# Patient Record
Sex: MALE | Race: WHITE
[De-identification: names, ages, dates, MRNs, and addresses within clinical notes are randomized per-mention and may not be internally consistent; named-entity substitution may affect disease eponyms.]

---

## 2018-12-10 ENCOUNTER — HOSPITAL ENCOUNTER (OUTPATIENT)
Dept: HOSPITAL 74 - JASU-SURG | Age: 83
Discharge: HOME | End: 2018-12-10
Attending: SURGERY
Payer: COMMERCIAL

## 2018-12-10 VITALS — TEMPERATURE: 98 F | DIASTOLIC BLOOD PRESSURE: 77 MMHG | HEART RATE: 66 BPM | SYSTOLIC BLOOD PRESSURE: 138 MMHG

## 2018-12-10 VITALS — BODY MASS INDEX: 28.8 KG/M2

## 2018-12-10 DIAGNOSIS — E11.9: Primary | ICD-10-CM

## 2018-12-10 DIAGNOSIS — Z79.84: ICD-10-CM

## 2018-12-10 PROCEDURE — 0LBP0ZZ EXCISION OF LEFT LOWER LEG TENDON, OPEN APPROACH: ICD-10-PCS | Performed by: SURGERY

## 2018-12-10 NOTE — HP
Satellite PMH





- Chief Complaint


History of Present Illness: 85 year old man with open wound of left posterior 

calf with exposed tendon. He is s/p revascularization of femoral and tibial 

arteries with improved vascularity of the wound.


History Source: Patient


Limitations to Obtaining History: No Limitations





- Past Medical History


Allergies/Adverse Reactions: 


 Allergies











Allergy/AdvReac Type Severity Reaction Status Date / Time


 


No Known Allergies Allergy   Verified 12/07/18 12:32











Cardiovascular: Yes: CAD, Hyperlipdemia, Other (PAD)


Endocrine: Yes: Diabetes Mellitus, Hypothyroidism





- Current Medications


Current Medications: 


 Home Medications











 Medication  Instructions  Recorded


 


Levothyroxine [Synthroid -] 150 mcg PO DAILY 11/28/18


 


Lipitor 40 mg PO DAILY 11/28/18


 


Metformin HCl  mg PO TID 11/28/18


 


Metoprolol Tartrate 25 mg PO BID 11/28/18


 


Aspirin [ASA -] 81 mg PO DAILY 12/07/18


 


Ergocalciferol [Vitamin D2] 50,000 unit PO Q7D@1000 12/07/18














Satellite Physical Exam





- Physical Examination


Vital Signs: 


 Vital Signs











 Period  Temp  Pulse  Resp  BP Sys/Noguera  Pulse Ox


 


 Last 24 Hr  98.0 F-98.0 F  87-87  18-18  103-103/65-65  95











General Appearance: Alert & Oriented x3


ENT: Clear


Lung: Clear to auscultation


Heart: Regular rate & rhythm


Abdomen: Soft


Extremities: Other (2+ edema left ankle and foot. Open wound posterior lower 

calf with necrotic Achilles tendon and granulation tissue around.)





Satellite Impression/Plan





- Impression/Plan


Impression: Open wound with necrotic tendon left foot.  S/p revascularization 

with improved distal flow.  DM


Operative Procedure: Excsional debridement skin and tendon left leg


Date to be Performed: 12/10/18

## 2018-12-10 NOTE — OP
Operative Note





- Note:


Operative Date: 12/10/18


Pre-Operative Diagnosis: Necrosis Achilles tendon left leg.  Open wound left leg


Operation: Excisional debridement left leg wound, skin, subQ and tendon


Findings: 





Necrosis of superficial portion left Achilles tendon. Deeper section of tendon 

appeared viable.


Post-Operative Diagnosis: Same as Pre-op


Surgeon: Cas Santos


Anesthesiologist/CRNA: Holland Landry


Anesthesia: General


Specimens Removed: Achilles tendon


Estimated Blood Loss (mls): 20


Operative Report Dictated: Yes

## 2018-12-11 NOTE — OP
DATE OF OPERATION:  12/10/2018

 

SURGEON:  Cas Salguero MD

 

PROCEDURE:  Excisional debridement of left leg wound including skin and tendon.

 

PREOPERATIVE DIAGNOSIS:  Necrotic Achilles tendon, left leg.

 

POSTOPERATIVE DIAGNOSIS:  Necrotic Achilles tendon, left leg.

 

ANESTHESIA:  General

 

ANESTHESIOLOGIST:  Holland Landry MD

 

OPERATIVE FINDINGS:  There was a chronic wound on the posterior aspect of the left

lower calf and heel.  The wound measured approximately 15 cm in length, and there was

exposed Achilles tendon which was necrotic superficially.

 

OPERATIVE PROCEDURE:  Following routine patient identification with side and site

verification, general anesthesia was induced.  The patient was placed in the prone

position with appropriate padding.  The left leg and foot were prepped with Betadine

solution.  Timeout was performed.  The tract over the Achilles tendon was opened

superiorly with a scalpel, using cautery for hemostasis.  The tendon was then excised

to remove the necrotic portion without total excision of the tendon.  The cut portion

appeared viable with bleeding seen from the base.  The tendon was debrided from the

calcaneus back to the upper end of the incision as there did not appear to be any

tracking along the tendon.  Scalpel and curette were then used to debride the

surrounding soft tissue and skin to remove any nonviable tissue.  The wound was

irrigated with saline.  The wound was then packed open with moist gauze, covered with

Xeroform, dry gauze, ABD pad, Kerlix, and Ace bandage.  A posterior mold was then

created using fiberglass to keep the foot in a neutral position.  This was attached

to the leg with a couple of Ace bandages.  The patient was then taken to the recovery

room in stable condition.

 

 

CAS SALGUERO M.D.

 

[id number]

DD: 12/10/2018 16:52

DT: 12/11/2018 09:03

Job #:  [id number]

## 2018-12-12 NOTE — PATH
Surgical Pathology Report



Patient Name:  CARMNE ARORA

Accession #:  [id number]

[facility]. Rec. #:  [id number]                                                        

   /Age/Gender:  1933 (Age: 85) / M

Account:  [id number]                                                          

             Location: [facility] SURGICAL

Taken:  12/10/2018

Received:  2018

Reported:  2018

Physicians:  Cas Santos M.D.

  



Specimen(s) Received

 NECROTIC TISSUE LEFT ACHILLES TENDON 





Clinical History

Necrosis Achilles tendon







Final Diagnosis

ACHILLES TENDON, LEFT, NECROTIC TISSUE, EXCISION:

SOFT TISSUE WITH MARKED ACUTE NECROTIZING INFLAMMATION AND GRANULATION TISSUE.





***Electronically Signed***

Elisa Angel M.D.





Gross Description

Received in formalin labeled "necrotic tissue left Achilles tendon," is a 6.5 x

4.4 x 1.2 cm aggregate of tan-grey, necrotic portions of fibrous tissue,

consistent with necrotic tendon. Representative sections are submitted in one

cassette.

## 2019-01-18 ENCOUNTER — HOSPITAL ENCOUNTER (EMERGENCY)
Dept: HOSPITAL 74 - JER | Age: 84
Discharge: HOME | End: 2019-01-18
Payer: COMMERCIAL

## 2019-01-18 VITALS — DIASTOLIC BLOOD PRESSURE: 100 MMHG | SYSTOLIC BLOOD PRESSURE: 135 MMHG | TEMPERATURE: 98 F | HEART RATE: 75 BPM

## 2019-01-18 VITALS — BODY MASS INDEX: 27.6 KG/M2

## 2019-01-18 DIAGNOSIS — E89.0: ICD-10-CM

## 2019-01-18 DIAGNOSIS — Z79.84: ICD-10-CM

## 2019-01-18 DIAGNOSIS — L03.116: Primary | ICD-10-CM

## 2019-01-18 DIAGNOSIS — E11.9: ICD-10-CM

## 2019-01-18 DIAGNOSIS — Z85.850: ICD-10-CM

## 2019-01-18 LAB
ALBUMIN SERPL-MCNC: 3 G/DL (ref 3.4–5)
ALP SERPL-CCNC: 94 U/L (ref 45–117)
ALT SERPL-CCNC: 19 U/L (ref 13–61)
ANION GAP SERPL CALC-SCNC: 10 MMOL/L (ref 8–16)
AST SERPL-CCNC: 20 U/L (ref 15–37)
BASOPHILS # BLD: 0.7 % (ref 0–2)
BILIRUB SERPL-MCNC: 0.6 MG/DL (ref 0.2–1)
BUN SERPL-MCNC: 20 MG/DL (ref 7–18)
CALCIUM SERPL-MCNC: 8.4 MG/DL (ref 8.5–10.1)
CHLORIDE SERPL-SCNC: 102 MMOL/L (ref 98–107)
CO2 SERPL-SCNC: 28 MMOL/L (ref 21–32)
CREAT SERPL-MCNC: 0.8 MG/DL (ref 0.55–1.3)
DEPRECATED RDW RBC AUTO: 13.8 % (ref 11.9–15.9)
EOSINOPHIL # BLD: 0.7 % (ref 0–4.5)
GLUCOSE SERPL-MCNC: 121 MG/DL (ref 74–106)
HCT VFR BLD CALC: 35.9 % (ref 35.4–49)
HGB BLD-MCNC: 12.5 GM/DL (ref 11.7–16.9)
LYMPHOCYTES # BLD: 9.7 % (ref 8–40)
MCH RBC QN AUTO: 31 PG (ref 25.7–33.7)
MCHC RBC AUTO-ENTMCNC: 34.8 G/DL (ref 32–35.9)
MCV RBC: 89.1 FL (ref 80–96)
MONOCYTES # BLD AUTO: 10.2 % (ref 3.8–10.2)
NEUTROPHILS # BLD: 78.7 % (ref 42.8–82.8)
PLATELET # BLD AUTO: 245 K/MM3 (ref 134–434)
PMV BLD: 8.1 FL (ref 7.5–11.1)
POTASSIUM SERPLBLD-SCNC: 4.4 MMOL/L (ref 3.5–5.1)
PROT SERPL-MCNC: 6.7 G/DL (ref 6.4–8.2)
RBC # BLD AUTO: 4.02 M/MM3 (ref 4–5.6)
SODIUM SERPL-SCNC: 139 MMOL/L (ref 136–145)
WBC # BLD AUTO: 10.4 K/MM3 (ref 4–10)

## 2019-01-18 NOTE — PDOC
History of Present Illness





- General


Chief Complaint: Wound


Stated Complaint: WOUND


Time Seen by Provider: 01/18/19 18:49





- History of Present Illness


Initial Comments: 





The patient is a 85M w/ a history of T2DM and a chronic L achilles wound 

presents for evaluation of several days of worsening LLE erythema, pain, and 

swelling proximal to the wound. The patient was evaluated by wound care 

yesterday and given Augmentin BID, of which he has taken two doses. He was seen 

by the home wound care nurse today and advised to present to the ED for 

evaluation for concern of worsening cellulitis vs DVT.


Patient's dressing is normally changes Q3D.


Patient ambulates with cane. No prolonged lack of ambulation


Denies fevers/chills, myalgias, HA, vision changes, chest pain, SOB, abdominal 

pain, N/V/C/D.


01/18/19 20:38








Past History





- Past Medical History


Allergies/Adverse Reactions: 


 Allergies











Allergy/AdvReac Type Severity Reaction Status Date / Time


 


No Known Allergies Allergy   Verified 01/18/19 18:58











Home Medications: 


Ambulatory Orders





Levothyroxine [Synthroid -] 150 mcg PO DAILY 11/28/18 


Lipitor 40 mg PO DAILY 11/28/18 


Metformin HCl  mg PO TID 11/28/18 


Metoprolol Tartrate 25 mg PO BID 11/28/18 


Aspirin [ASA -] 81 mg PO DAILY 12/07/18 


Ergocalciferol [Vitamin D2] 50,000 unit PO SA 12/07/18 


Collagenase Clostridium Hist. [Santyl] 1 applic TP DAILY 30 Days #60 oint...g. 

12/12/18 


Amox-Tr/K Cl [Augmentin - 875Mg Tablet] 1 tab PO BID 01/18/19 








Anemia: No


Asthma: No


Cancer: Yes (thyroid (removed))


Cardiac Disorders: No


CVA: No


COPD: No


CHF: No


Dementia: No


Diabetes: Yes


GI Disorders: No


 Disorders: No


HTN: No


Hypercholesterolemia: No


Liver Disease: No


Seizures: No


Thyroid Disease: No





- Surgical History


Orthopedic Surgery: Yes (aria knees , right hip replacement)





- Suicide/Smoking/Psychosocial Hx


Smoking History: Unknown if ever smoked


Have you smoked in the past 12 months: No


Hx Alcohol Use: No


Drug/Substance Use Hx: No


Substance Use Type: None





**Review of Systems





- Review of Systems


Able to Perform ROS?: Yes


Comments:: 





GENERAL/CONSTITUTIONAL: No fever or chills. No weakness


HEAD, EYES, EARS, NOSE AND THROAT: No change in vision. No ear pain or 

discharge. No sore throat


CARDIOVASCULAR: No chest pain or shortness of breath


RESPIRATORY: Denies cough, hemoptysis


GASTROINTESTINAL: No nausea, vomiting, diarrhea or constipation


GENITOURINARY: No dysuria, frequency, or change in urination


MUSCULOSKELETAL: No joint or muscle swelling or pain. No neck or back pain


SKIN: per HPI


NEUROLOGIC: No headache, vertigo, loss of consciousness, or change in strength/

sensation


ENDOCRINE: No increased thirst. No abnormal weight change


HEMATOLOGIC/LYMPHATIC: No anemia, easy bleeding, or history of blood clots


ALLERGIC/IMMUNOLOGIC: No hives or skin allergy





01/18/19 20:13





Is the patient limited English proficient: No





*Physical Exam





- Vital Signs


 Last Vital Signs











Temp Pulse Resp BP Pulse Ox


 


 98 F   75   18   135/100   98 


 


 01/18/19 18:58  01/18/19 18:58  01/18/19 18:58  01/18/19 18:58  01/18/19 18:58














- Physical Exam


Comments: 





GENERAL: Awake, alert, and fully oriented, in no acute distress


HEAD: No signs of trauma, normocephalic, atraumatic


EYES: PERRLA, EOMI, sclera anicteric, conjunctiva clear


ENT: Hearing grossly normal, nares patent, oropharynx clear without exudates. 

Moist mucosa


LUNGS: No distress, speaks full sentences, clear to auscultation bilaterally 


HEART: Regular rate and rhythm, normal S1 and S2, no murmurs appreciated, 

peripheral pulses normal and equal bilaterally


ABDOMEN: Soft, nontender, normoactive bowel sounds. No guarding, no rebound


EXTREMITIES: Wound as per below, otherwise FROM and strength 5/5 throughout


NEUROLOGICAL: Cranial nerves II through XII grossly intact. Normal speech, no 

focal sensorimotor deficits


SKIN: L open achilles wound, 13cm x 4cm, good distal granulation with area of 

proximal necrosis w/ fibrinous exudate. Proximal to wound, cellulitis, erythema

, swelling, warmth to proximal 1/3 of calf.





01/18/19 20:15








Moderate Sedation





- Procedure Monitoring


Vital Signs: 


Procedure Monitoring Vital Signs











Temperature  98 F   01/18/19 18:58


 


Pulse Rate  75   01/18/19 18:58


 


Respiratory Rate  18   01/18/19 18:58


 


Blood Pressure  135/100   01/18/19 18:58


 


O2 Sat by Pulse Oximetry (%)  98   01/18/19 18:58











ED Treatment Course





- LABORATORY


CBC & Chemistry Diagram: 


 01/18/19 21:10





 01/18/19 21:10





- RADIOLOGY


Radiology Studies Ordered: 














 Category Date Time Status


 


 CHEST X-RAY PORTABLE* [RAD] Stat Radiology  01/18/19 20:11 Ordered














Medical Decision Making





- Medical Decision Making





The patient is an 85M w/ a history of T2DM and a L open achilles wound who 

presents for evaluation for concern of proximal cellulitis vs DVT





ED Course


CMP, CBC, Blood cx, ESR, CRP


ECG, CXR


01/18/19 20:45





No LENI


No leukocytosis


CRP mildly elevated


Discussed case w/ Dr. Santos, in agreement pt okay to D/C home with 

continued Augmentin regimen.


Plan discussed w/ patient who in agreement and verbalized understanding


Discharge instructions and return precautions given





Dispo: home


01/19/19 04:59








*DC/Admit/Observation/Transfer


Diagnosis at time of Disposition: 


Cellulitis, leg


Qualifiers:


 Laterality: left Qualified Code(s): L03.116 - Cellulitis of left lower limb








- Discharge Dispostion


Disposition: HOME


Condition at time of disposition: Stable


Decision to Admit order: No





- Referrals


Referrals: 


Cas Santos MD [Staff Physician] - 





- Patient Instructions


Printed Discharge Instructions:  DI for Cellulitis -- Adult


Additional Instructions: 


You were seen in the Emergency Department for evaluation of left leg redness 

concerning for cellulitis. You did not have a fever and labs were not 

concerning for systemic infection. Continue to take the antibiotic prescribed. 

Follow up with your primary care provider and Dr. Santos.


Return to the Emergency Department if you develop fevers/chills, chest pain, 

trouble breathing, worsening of your cellulitis despite antibiotic use, or any 

new/concerning symptoms.





- Post Discharge Activity

## 2019-01-18 NOTE — PDOC
Rapid Medical Evaluation


Time Seen by Provider: 01/18/19 18:49


Medical Evaluation: 


 Allergies











Allergy/AdvReac Type Severity Reaction Status Date / Time


 


No Known Allergies Allergy   Verified 12/26/18 14:39











01/18/19 18:52





I have performed a brief in-person evaluation of this patient.





The patient presents with a chief complaint of:  Sent in for evaluation for 

possible cellulitis to LLE. Pt f/u with Dr Santos and is currently on 

augmentin, took 2 doses so far. States visiting nurse saw wound today and was 

concerned about ?worsening swelling and warmth to L leg. Pt denies pain or 

fever. H/o DM, necrosis of L achilles tendon w/ open wound, s/p debridement 12/ 18





Pertinent physical exam findings:Stable and well brauloi, w/ LLE dressing in place





I have ordered the following:labs





The patient will proceed to the ED for further evaluation.





01/18/19 18:59








**Discharge Disposition





- Diagnosis


Cellulitis, leg


Qualifiers:


 Laterality: left Qualified Code(s): L03.116 - Cellulitis of left lower limb








- Referrals





- Patient Instructions





- Post Discharge Activity

## 2019-01-18 NOTE — PDOC
Attending Attestation





- HPI


HPI: 





01/18/19 20:57


The patient is a 85 year old male, with a significant PMH of diabetes mellitus, 

who presents to the emergency department for evaluation of worsening chronic 

left lower extremity wound. The patient endorses 3 days of worsening left lower 

extremity erythema, edema and pain proximal to the wound. The patient states he 

was seen by wound care Dr Hill yesterday and started on Augmentin 875 mg BID (

patient states he has since completed 2 doses). The patient states the home 

wound care nurse advised the patient to come to the ED for evaluation today 

secondary to worsening cellulitis vs DVT. The patient states he is able to 

ambulate with a limp. 





The patient denies chest pain, shortness of breath, headache and dizziness.


Denies fever, chills, nausea, vomit, diarrhea and constipation.


Denies dysuria, frequency, urgency and hematuria.





Allergies: NKA





Documentation prepared by Reilly Landrum, acting as medical scribe for Caroline Madrid MD.





- Physicial Exam


PE: 





01/18/19 21:45


GENERAL: Awake, alert, and fully oriented, in no acute distress


HEAD: No signs of trauma


EYES: PERRLA, EOMI, sclera anicteric, conjunctiva clear


ENT: Auricles normal inspection, hearing grossly normal, nares patent, 

oropharynx clear without exudates. Moist mucosa


NECK: Normal ROM, supple, no lymphadenopathy, JVD, or masses


LUNGS: Breath sounds equal, clear to auscultation bilaterally.  No wheezes, and 

no crackles


HEART: Regular rate and rhythm, normal S1 and S2, no murmurs, rubs or gallops


ABDOMEN: Soft, nontender, normoactive bowel sounds.  No guarding, no rebound.  

No masses


EXTREMITIES: Normal range of motion, no edema.  No clubbing or cyanosis. No 

cords, erythema, or tenderness


NEUROLOGICAL: Cranial nerves II through XII grossly intact.  Normal speech. 


SKIN: (+) 12 cm by 5 cm left open achilles wound, good distal granulation with 

area of proximal necrosis w/ fibrinous exudate. (+) Erythema, edema, and warmth 

noted to proximal 1/3 of calf. Warm, Dry, normal turgor, no rashes.








<Reilly Landrum - Last Filed: 01/18/19 21:45>





- Resident


Resident Name: Jonathon Masters





- ED Attending Attestation


I have performed the following: I have examined & evaluated the patient, The 

case was reviewed & discussed with the resident, I agree w/resident's findings 

& plan





- Medical Decision Making





01/18/19 20:36


Pt will have sonogram of the left calf. He had a wet to dry dressing replaced.


He has surrounding cellulitis going up the posterior aspect of his calf.


01/18/19 22:06


Pt's sed rate is 92. 


Duplex doppler of the left calf is pending.


01/18/19 22:45


Pt's sed rate and CRP are elevated; we spoke to wound care/vasc surg Dr. Santos who is aware and agrees that pt is stable for discharge home.





<Caroline Madrid - Last Filed: 01/18/19 22:46>

## 2019-01-20 NOTE — EKG
Test Reason : 

Blood Pressure : ***/*** mmHG

Vent. Rate : 092 BPM     Atrial Rate : 092 BPM

   P-R Int : 196 ms          QRS Dur : 088 ms

    QT Int : 342 ms       P-R-T Axes : 051 039 018 degrees

   QTc Int : 422 ms

 

NORMAL SINUS RHYTHM

NORMAL ECG

NO PREVIOUS ECGS AVAILABLE

Confirmed by VANDANA SANCHEZ MD ([id number]) on 1/20/2019 7:27:11 PM

 

Referred By:             Confirmed By:VANDANA SANCHEZ MD

## 2019-01-28 ENCOUNTER — HOSPITAL ENCOUNTER (INPATIENT)
Dept: HOSPITAL 74 - JER | Age: 84
LOS: 4 days | Discharge: HOME HEALTH | DRG: 920 | End: 2019-02-01
Attending: INTERNAL MEDICINE | Admitting: INTERNAL MEDICINE
Payer: COMMERCIAL

## 2019-01-28 ENCOUNTER — HOSPITAL ENCOUNTER (OUTPATIENT)
Dept: HOSPITAL 74 - JASU-SURG | Age: 84
Discharge: HOME | End: 2019-01-28
Attending: SURGERY
Payer: COMMERCIAL

## 2019-01-28 VITALS — HEART RATE: 94 BPM | TEMPERATURE: 98 F | DIASTOLIC BLOOD PRESSURE: 72 MMHG | SYSTOLIC BLOOD PRESSURE: 130 MMHG

## 2019-01-28 VITALS — BODY MASS INDEX: 28.9 KG/M2

## 2019-01-28 VITALS — BODY MASS INDEX: 29.1 KG/M2

## 2019-01-28 DIAGNOSIS — R31.0: ICD-10-CM

## 2019-01-28 DIAGNOSIS — E11.52: ICD-10-CM

## 2019-01-28 DIAGNOSIS — L76.82: Primary | ICD-10-CM

## 2019-01-28 DIAGNOSIS — I10: ICD-10-CM

## 2019-01-28 DIAGNOSIS — E03.9: ICD-10-CM

## 2019-01-28 DIAGNOSIS — E78.5: ICD-10-CM

## 2019-01-28 DIAGNOSIS — Z98.61: ICD-10-CM

## 2019-01-28 DIAGNOSIS — I25.10: ICD-10-CM

## 2019-01-28 DIAGNOSIS — Z79.84: ICD-10-CM

## 2019-01-28 DIAGNOSIS — N20.0: ICD-10-CM

## 2019-01-28 DIAGNOSIS — I96: ICD-10-CM

## 2019-01-28 DIAGNOSIS — E87.2: ICD-10-CM

## 2019-01-28 DIAGNOSIS — L03.116: ICD-10-CM

## 2019-01-28 DIAGNOSIS — E11.622: ICD-10-CM

## 2019-01-28 PROCEDURE — 0JBP0ZZ EXCISION OF LEFT LOWER LEG SUBCUTANEOUS TISSUE AND FASCIA, OPEN APPROACH: ICD-10-PCS | Performed by: SURGERY

## 2019-01-28 PROCEDURE — 0LBP0ZZ EXCISION OF LEFT LOWER LEG TENDON, OPEN APPROACH: ICD-10-PCS | Performed by: SURGERY

## 2019-01-28 PROCEDURE — G0378 HOSPITAL OBSERVATION PER HR: HCPCS

## 2019-01-28 PROCEDURE — 0KBT0ZZ EXCISION OF LEFT LOWER LEG MUSCLE, OPEN APPROACH: ICD-10-PCS | Performed by: SURGERY

## 2019-01-28 NOTE — HP
Satellite PMH





- Chief Complaint


History of Present Illness: 85 year old man with open wound of left posterior 

calf s/p debridement 1 month ago. He has deveoped proximal infection under skin 

requiring drainage.


History Source: Patient





- Past Medical History


Allergies/Adverse Reactions: 


 Allergies











Allergy/AdvReac Type Severity Reaction Status Date / Time


 


No Known Allergies Allergy   Verified 01/28/19 15:41











Cardiovascular: Yes: CAD, Hyperlipdemia, Other (PAD)


Endocrine: Yes: Diabetes Mellitus, Hypothyroidism





- Current Medications


Current Medications: 


 Home Medications











 Medication  Instructions  Recorded


 


Levothyroxine [Synthroid -] 150 mcg PO DAILY 11/28/18


 


Lipitor 40 mg PO DAILY 11/28/18


 


Metformin HCl  mg PO TID 11/28/18


 


Metoprolol Tartrate 25 mg PO BID 11/28/18


 


Aspirin [ASA -] 81 mg PO DAILY 12/07/18


 


Ergocalciferol [Vitamin D2] 50,000 unit PO SA 12/07/18


 


Collagenase Clostridium Hist. 1 applic TP DAILY 30 Days #60 12/12/18





[Santyl] oint...g. 














Satellite Physical Exam





- Physical Examination


Vital Signs: 


 Vital Signs











 Period  Temp  Pulse  Resp  BP Sys/Noguera  Pulse Ox


 


 Last 24 Hr  97.4 F  105  20  104/60  98











General Appearance: Alert & Oriented x3


ENT: Clear


Lung: Clear to auscultation


Heart: Regular rate & rhythm


Abdomen: Soft, No tenderness


Extremities: Other (Left posterior calf with granulating base. Proximal tunnel 

with purulent drainage)





Satellite Impression/Plan





- Impression/Plan


Impression: Infection left calf


Operative Procedure: Debridement of left calf


Date to be Performed: 01/28/19

## 2019-01-28 NOTE — PN
Teaching Attending Note


Name of Resident: Lisha Lala





ATTENDING PHYSICIAN STATEMENT





I saw and evaluated the patient.


I reviewed the resident's note and discussed the case with the resident.


I agree with the resident's findings and plan as documented.








SUBJECTIVE:





Patient is an 85 year old man with PMH of NIDDM, hypothyroidism (?thyroidectomy)

, revascularization of femoral and tibial arteries, HTN, HLD, CAD with 1 stent, 

bilateral total knee replacement, right hip replacement and a chronic left 

achilles wound who presents to the ER due to inability to ambulate. He was seen 

in the ER on 1/18/19 for wound infection and underwent excisional debridement 

of fascia and tendon of left posterior calf earlier today with Dr Rogel. 

He had his surgery at 4:30 pm and was sent home by 6:30pm. He usually ambulates 

but is unable to do so. Was unable to get up to his 3rd floor apartment. Feels 

weak and sedated. He denies pain, nausea, vomiting, fever or chills. He lives 

alone and has a healthcare aide 3 days/week.





OBJECTIVE:


Alert


 Vital Signs











 Period  Temp  Pulse  Resp  BP Sys/Noguera  Pulse Ox


 


 Last 24 Hr  98.0 F  128  16  92/53  100








HEENT: No Jaundice, eye redness or discharge, PERRLA, EOMI. Normocephalic, 

atraumatic. External ears are normal and hearing is grossly intact. No nasal 

discharge.


Neck: Supple, nontender. No palpable adenopathy or thyromegaly. No JVD


Chest: Good effort. Clear to auscultation and percussion.


Heart: Regular. No S3, rub or murmur


Abdomen: Not distended, soft, nontender and no HSM. No rebound or guarding.  

Normoactive bowel sounds.


Ext: Peripheral pulses intact. Left leg wound dressed.


Skin: Warm and dry. No petechiae, rash or ecchymosis.


Neuro: Alert. Oriented x3. CN 2-12 grossly intact. Sensation grossly intact in 

all four extremities and DTR are symmetric.





 Home Medications











 Medication  Instructions  Recorded


 


Levothyroxine [Synthroid -] 150 mcg PO DAILY 11/28/18


 


Lipitor 40 mg PO DAILY 11/28/18


 


Metformin HCl  mg PO TID 11/28/18


 


Metoprolol Tartrate 25 mg PO BID 11/28/18


 


Aspirin [ASA -] 81 mg PO DAILY 12/07/18


 


Ergocalciferol [Vitamin D2] 50,000 unit PO SA 12/07/18


 


Collagenase Clostridium Hist. 1 applic TP DAILY 30 Days #60 12/12/18





[Santyl] oint...g. 


 


Clindamycin [Cleocin -] 300 mg PO TID #21 capsule 01/28/19








ASSESSMENT AND PLAN:


1. Post left leg debridement/Inability to ambulate - Has had tachycardia and 

low BP. Sepsis is key concern. Will check TFT, CBC, CMP, Urinalysis, EKG, CXR, 

lactic acid and blood cultures STAT. Key concern is to rule out sepsis. Will 

treat with IV NS, vancomycin and zosyn pending sepsis workup. Consult ID and 

Surgery.





2. DM - For now, we will hold the home diabetes drugs and implement sliding 

scale insulin regimen. Provide comprehensive diabetes care with patient 

teaching and counseling about the importance of euglycemia, eye care and foot 

care.





3. DVT prophylaxis - Lovenox 40 mg SQ q 24 hours.





4. Advance directives - Full code

## 2019-01-28 NOTE — OP
Operative Note





- Note:


Operative Date: 01/28/19


Pre-Operative Diagnosis: Infected wound left calf


Operation: Excisional debridement of fascia and tendon left posterior calf


Findings: 





Necrotic fascia and tendon of posterior compartment of left calf


Post-Operative Diagnosis: Same as Pre-op


Surgeon: Cas Santos


Anesthesiologist/CRNA: Lm Abdi


Anesthesia: Fractional


Estimated Blood Loss (mls): 50

## 2019-01-28 NOTE — PDOC
Attending Attestation





- HPI


HPI: 





The patient is an 85 year old male with a significant past medical history of 

achilles tendon surgery (1/28/2019), who presents to the emergency department 

today complaining of trouble with ambulation. Patient states they had achilles 

tendon surgery this afternoon and was discharged at approximately 6:30pm. 

Patient states he lives on the third floor and was unable to ambulate there. 

Patient denies any other complaints. 





The patient denies chest pain, shortness of breath, headache and dizziness.


Denies fever, chills, nausea, vomit, diarrhea and constipation.


Denies dysuria, frequency, urgency and hematuria.





Allergies: NKA


Past surgical history: achilles tendon surgery (1/28/2019)


Social history: No reported


PCP: Dr. Royal Lubin





01/28/19 22:37








<Shivani Bro - Last Filed: 01/28/19 22:37>





- Resident


Resident Name: Antonella Krishnamurthy





- ED Attending Attestation


I have performed the following: I have examined & evaluated the patient, The 

case was reviewed & discussed with the resident, I agree w/resident's findings 

& plan, Exceptions are as noted





- HPI


HPI: 





01/28/19 22:32


this 84 yo male just had surgery by Dr Santos this evening at 6 pm and was 

discharged home. the patinet lives on the third floor and was brought home by a 

friend but the patent could not walk up the stairs and his friend could not 

carry him up the stairs so they returned to the hospital


01/28/19 22:33








- Physicial Exam


PE: 





01/29/19 00:28


tall 84 yo male p/w  left  lower extremity pain s/p surgery this afternoon


head ncat


neck supple


lungs no wheezing


cvs [id number]


abd flat,nontender


extremities  left leg is bandaged following surgery tonight


neuro axox3


skin warm and dry





- Medical Decision Making





01/28/19 22:34


84 yo male who is diabetes and had an excisional debridement of fascia and 

tendon  of left posterior calf tonight and was initallly discharged but was 

unable to walk up his stairs to his third floor apartment.  


Pt admitted for OBS med/surg 


01/28/19 22:35





01/29/19 00:36





01/29/19 00:37








<Mehnaz Bales - Last Filed: 01/29/19 00:37>





Attestations





- Attestations





01/28/19 22:38





Documentation prepared by Shivani Bro, acting as medical scribe for Mehnaz Bales MD.





<Shivani Bro - Last Filed: 01/28/19 22:37>

## 2019-01-28 NOTE — PDOC
History of Present Illness





- General


Chief Complaint: Pain, Acute


Stated Complaint: FOLLOW UP


Time Seen by Provider: 01/28/19 21:59


History Source: Patient


Exam Limitations: No Limitations





- History of Present Illness


Initial Comments: 





01/28/19 22:02


This is an 86 yo M with PMH of T2DM and a chronic L achilles wound, who was in 

the ED 1/18/19 for wound infection and underwent excisional debridement of 

fascia and tendon left posterior calf earlier today with Dr Rogel, now 

resents due to inability to ambulate. patient had his surgery at 430 and was 

sent home by 630pm. he usually ambulates but is unable to do so at this time, 

unable to get up to the 3rd floor where his apt is and still feels weak and 

sedated. He denies pain, n/v/c, f/c. He lives alone and has and aid 3 days/week





01/28/19 22:03





01/28/19 22:30





01/28/19 22:34








Past History





- Past Medical History


Allergies/Adverse Reactions: 


 Allergies











Allergy/AdvReac Type Severity Reaction Status Date / Time


 


No Known Allergies Allergy   Verified 01/28/19 21:59











Home Medications: 


Ambulatory Orders





Levothyroxine [Synthroid -] 150 mcg PO DAILY 11/28/18 


Lipitor 40 mg PO DAILY 11/28/18 


Metformin HCl  mg PO TID 11/28/18 


Metoprolol Tartrate 25 mg PO BID 11/28/18 


Aspirin [ASA -] 81 mg PO DAILY 12/07/18 


Ergocalciferol [Vitamin D2] 50,000 unit PO SA 12/07/18 


Collagenase Clostridium Hist. [Santyl] 1 applic TP DAILY 30 Days #60 oint...g. 

12/12/18 


Clindamycin [Cleocin -] 300 mg PO TID #21 capsule 01/28/19 








Anemia: No


Asthma: No


Cancer: Yes (thyroid (removed))


Cardiac Disorders: No


CVA: No


COPD: No


CHF: No


Dementia: No


Diabetes: Yes


GI Disorders: No


 Disorders: No


HTN: Yes


Hypercholesterolemia: Yes


Liver Disease: No


Seizures: No


Thyroid Disease: No





- Surgical History


Cardiac Surgery: Yes (STENT X1- 5 YEARS AGO)


Orthopedic Surgery: Yes (B/L TKR , right hip replacement)





- Suicide/Smoking/Psychosocial Hx


Smoking History: Never smoked


Have you smoked in the past 12 months: No


Information on smoking cessation initiated: No


Hx Alcohol Use: No


Drug/Substance Use Hx: No


Substance Use Type: None





**Review of Systems





- Review of Systems


Able to Perform ROS?: Yes


Is the patient limited English proficient: No


Constitutional: No: Chills, Fever


Respiratory: No: Cough, Orthopnea, Shortness of Breath


Cardiac (ROS): No: Chest Pain, Palpitations


ABD/GI: No: Constipated, Diarrhea, Nausea, Vomiting, Abdominal cramping


: No: Dysuria


Musculoskeletal: Yes: Muscle Weakness


Neurological: Yes: Weakness.  No: Headache





*Physical Exam





- Vital Signs


 Last Vital Signs











Temp Pulse Resp BP Pulse Ox


 


 98.0 F   128 H  16   92/53 L  100 


 


 01/28/19 21:43  01/28/19 21:43  01/28/19 21:43  01/28/19 21:43  01/28/19 21:43














- Physical Exam


General Appearance: Yes: Appropriately Dressed, Other (sedated )


HEENT: positive: EOMI.  negative: Normal Voice (slightly slurred ), Scleral 

Icterus (R), Scleral Icterus (L)


Neck: positive: Supple


Respiratory/Chest: positive: Lungs Clear, Normal Breath Sounds


Cardiovascular: positive: Regular Rhythm, Regular Rate, S1, S2


Gastrointestinal/Abdominal: positive: Normal Bowel Sounds, Flat, Soft.  negative

: Tender


Extremity: positive: Other (RLE clean dressing intact on foot )


Integumentary: positive: Dry, Warm


Neurologic: positive: CNs II-XII NML intact (grossly )





Moderate Sedation





- Procedure Monitoring


Vital Signs: 


Procedure Monitoring Vital Signs











Temperature  98.0 F   01/28/19 21:43


 


Pulse Rate  128 H  01/28/19 21:43


 


Respiratory Rate  16   01/28/19 21:43


 


Blood Pressure  92/53 L  01/28/19 21:43


 


O2 Sat by Pulse Oximetry (%)  100   01/28/19 21:43











ED Treatment Course





- ADDITIONAL ORDERS


Additional order review: 





01/28/19 22:34


patient is unable to ambulate and appears sedated from recent surgery. requires 

in hospital observation and PT eval post op 





*DC/Admit/Observation/Transfer


Diagnosis at time of Disposition: 


 Unable to ambulate





Cellulitis, leg


Qualifiers:


 Laterality: left Qualified Code(s): L03.116 - Cellulitis of left lower limb








- Discharge Dispostion


Condition at time of disposition: Guarded


Decision to Admit order: Yes





- Referrals





- Patient Instructions





- Post Discharge Activity

## 2019-01-29 LAB
ALBUMIN SERPL-MCNC: 2.6 G/DL (ref 3.4–5)
ALP SERPL-CCNC: 80 U/L (ref 45–117)
ALT SERPL-CCNC: 18 U/L (ref 13–61)
ANION GAP SERPL CALC-SCNC: 6 MMOL/L (ref 8–16)
ANION GAP SERPL CALC-SCNC: 7 MMOL/L (ref 8–16)
APPEARANCE UR: CLEAR
AST SERPL-CCNC: 16 U/L (ref 15–37)
BASOPHILS # BLD: 0.3 % (ref 0–2)
BASOPHILS # BLD: 0.6 % (ref 0–2)
BILIRUB SERPL-MCNC: 0.8 MG/DL (ref 0.2–1)
BILIRUB UR STRIP.AUTO-MCNC: NEGATIVE MG/DL
BUN SERPL-MCNC: 16 MG/DL (ref 7–18)
BUN SERPL-MCNC: 16 MG/DL (ref 7–18)
CALCIUM SERPL-MCNC: 7.9 MG/DL (ref 8.5–10.1)
CALCIUM SERPL-MCNC: 7.9 MG/DL (ref 8.5–10.1)
CHLORIDE SERPL-SCNC: 98 MMOL/L (ref 98–107)
CHLORIDE SERPL-SCNC: 99 MMOL/L (ref 98–107)
CO2 SERPL-SCNC: 29 MMOL/L (ref 21–32)
CO2 SERPL-SCNC: 29 MMOL/L (ref 21–32)
COLOR UR: (no result)
CREAT SERPL-MCNC: 0.8 MG/DL (ref 0.55–1.3)
CREAT SERPL-MCNC: 1.1 MG/DL (ref 0.55–1.3)
DEPRECATED RDW RBC AUTO: 13.9 % (ref 11.9–15.9)
DEPRECATED RDW RBC AUTO: 13.9 % (ref 11.9–15.9)
EOSINOPHIL # BLD: 0.1 % (ref 0–4.5)
EOSINOPHIL # BLD: 0.2 % (ref 0–4.5)
EPITH CASTS URNS QL MICRO: (no result) /HPF
GLUCOSE SERPL-MCNC: 194 MG/DL (ref 74–106)
GLUCOSE SERPL-MCNC: 250 MG/DL (ref 74–106)
HCT VFR BLD CALC: 31.3 % (ref 35.4–49)
HCT VFR BLD CALC: 31.7 % (ref 35.4–49)
HGB BLD-MCNC: 10.9 GM/DL (ref 11.7–16.9)
HGB BLD-MCNC: 11 GM/DL (ref 11.7–16.9)
KETONES UR QL STRIP: NEGATIVE
LEUKOCYTE ESTERASE UR QL STRIP.AUTO: NEGATIVE
LYMPHOCYTES # BLD: 3.7 % (ref 8–40)
LYMPHOCYTES # BLD: 8.7 % (ref 8–40)
MAGNESIUM SERPL-MCNC: 1.4 MG/DL (ref 1.8–2.4)
MCH RBC QN AUTO: 30.4 PG (ref 25.7–33.7)
MCH RBC QN AUTO: 31 PG (ref 25.7–33.7)
MCHC RBC AUTO-ENTMCNC: 34.6 G/DL (ref 32–35.9)
MCHC RBC AUTO-ENTMCNC: 34.9 G/DL (ref 32–35.9)
MCV RBC: 87.9 FL (ref 80–96)
MCV RBC: 88.8 FL (ref 80–96)
MONOCYTES # BLD AUTO: 7.9 % (ref 3.8–10.2)
MONOCYTES # BLD AUTO: 8.9 % (ref 3.8–10.2)
MUCOUS THREADS URNS QL MICRO: (no result)
NEUTROPHILS # BLD: 81.9 % (ref 42.8–82.8)
NEUTROPHILS # BLD: 87.7 % (ref 42.8–82.8)
NITRITE UR QL STRIP: NEGATIVE
PH UR: 7 [PH] (ref 5–8)
PHOSPHATE SERPL-MCNC: 3.1 MG/DL (ref 2.5–4.9)
PLATELET # BLD AUTO: 192 K/MM3 (ref 134–434)
PLATELET # BLD AUTO: 229 K/MM3 (ref 134–434)
PMV BLD: 7.7 FL (ref 7.5–11.1)
PMV BLD: 7.8 FL (ref 7.5–11.1)
POTASSIUM SERPLBLD-SCNC: 3.9 MMOL/L (ref 3.5–5.1)
POTASSIUM SERPLBLD-SCNC: 4.1 MMOL/L (ref 3.5–5.1)
PROT SERPL-MCNC: 6.3 G/DL (ref 6.4–8.2)
PROT UR QL STRIP: NEGATIVE
PROT UR QL STRIP: NEGATIVE
RBC # BLD AUTO: 3.52 M/MM3 (ref 4–5.6)
RBC # BLD AUTO: 3.61 M/MM3 (ref 4–5.6)
SODIUM SERPL-SCNC: 134 MMOL/L (ref 136–145)
SODIUM SERPL-SCNC: 134 MMOL/L (ref 136–145)
SP GR UR: 1.01 (ref 1.01–1.03)
UROBILINOGEN UR STRIP-MCNC: 2 MG/DL (ref 0.2–1)
WBC # BLD AUTO: 10.7 K/MM3 (ref 4–10)
WBC # BLD AUTO: 13.7 K/MM3 (ref 4–10)

## 2019-01-29 RX ADMIN — PIPERACILLIN SODIUM,TAZOBACTAM SODIUM SCH MLS/HR: 3; .375 INJECTION, POWDER, FOR SOLUTION INTRAVENOUS at 09:00

## 2019-01-29 RX ADMIN — INSULIN ASPART SCH UNIT: 100 INJECTION, SOLUTION INTRAVENOUS; SUBCUTANEOUS at 07:02

## 2019-01-29 RX ADMIN — PIPERACILLIN SODIUM,TAZOBACTAM SODIUM SCH MLS/HR: 3; .375 INJECTION, POWDER, FOR SOLUTION INTRAVENOUS at 02:02

## 2019-01-29 RX ADMIN — INSULIN ASPART SCH UNIT: 100 INJECTION, SOLUTION INTRAVENOUS; SUBCUTANEOUS at 21:06

## 2019-01-29 RX ADMIN — INSULIN ASPART SCH: 100 INJECTION, SOLUTION INTRAVENOUS; SUBCUTANEOUS at 14:07

## 2019-01-29 RX ADMIN — ATORVASTATIN CALCIUM SCH MG: 40 TABLET, FILM COATED ORAL at 21:07

## 2019-01-29 RX ADMIN — PIPERACILLIN SODIUM,TAZOBACTAM SODIUM SCH MLS/HR: 3; .375 INJECTION, POWDER, FOR SOLUTION INTRAVENOUS at 17:49

## 2019-01-29 RX ADMIN — INSULIN ASPART SCH UNIT: 100 INJECTION, SOLUTION INTRAVENOUS; SUBCUTANEOUS at 17:48

## 2019-01-29 NOTE — EKG
Test Reason : 

Blood Pressure : ***/*** mmHG

Vent. Rate : 103 BPM     Atrial Rate : 103 BPM

   P-R Int : 174 ms          QRS Dur : 092 ms

    QT Int : 322 ms       P-R-T Axes : 030 036 019 degrees

   QTc Int : 421 ms

 

SINUS TACHYCARDIA WITH PREMATURE ATRIAL COMPLEXES

OTHERWISE NORMAL ECG

Confirmed by MD WONG, JACOB ([id number]) on 1/29/2019 12:16:06 PM

 

Referred By:             Confirmed By:JACOB BACK MD

## 2019-01-29 NOTE — ECHO
______________________________________________________________________________



Name: CARMEN ARORA                                  Exam:Adult Echocardiogram

MRN: [id number]         Study Date: 2019 08:21 AM

Age: 85 yrs

______________________________________________________________________________



Reason For Study: ASS

Height: 73 in        Weight: 250 lb        BSA: 2.4 m2



______________________________________________________________________________



MMode/2D Measurements & Calculations

IVSd: 0.93 cm                                         Ao root diam: 3.3 cm

LVIDd: 4.2 cm                                         LA dimension: 4.1 cm

LVIDs: 2.9 cm

LVPWd: 0.92 cm



_______________________________________________________

EDV(Teich): 77.5 ml                                   LVOT diam: 2.3 cm

ESV(Teich): 33.6 ml



Doppler Measurements & Calculations

MV E max chris: 39.5 cm/sec                           Ao V2 max: 202.0 cm/sec

MV A max chris: 70.1 cm/sec                           Ao max P.3 mmHg

MV E/A: 0.56

MV dec time: 0.23 sec                               NUVIA(V,D): 2.3 cm2



_____________________________________________________

LV V1 max P.5 mmHg                              MR max chris: 317.6 cm/sec

LV V1 mean P.2 mmHg                             MR max P.4 mmHg

LV V1 max: 106.6 cm/sec

LV V1 mean: 69.7 cm/sec

LV V1 VTI: 17.4 cm



_____________________________________________________

SV(LVOT): 74.5 ml                                   Med Peak E' Chris: 5.4 cm/sec

                                                    Med E/e': 7.4

                                                    Lat Peak E' Chris: 10.2 cm/sec

                                                    Lat E/e': 3.9





______________________________________________________________________________

Procedure

A two-dimensional transthoracic echocardiogram with color flow and Doppler was performed. The study w
as

technically difficult with many images being suboptimal in quality.

Left Ventricle

The left ventricle is normal in size. Left ventricular systolic function is normal. Ejection Fraction
 = 60-

65%. E/A reversal consistent with but not diagnostic of poor LV compliance.

Right Ventricle

The right ventricle is not well visualized. The right ventricle is grossly normal size. The right aleksandra
tricular

systolic function is grossly normal.

Atria

The left atrium is mildly dilated. Right atrial size is normal.

Mitral Valve

There is mild mitral valve thickening. There is mild mitral annular calcification. There is mild mitr
al

regurgitation.

Tricuspid Valve

The tricuspid valve is normal. There is trace tricuspid regurgitation.

Aortic Valve

There is mild aortic sclerosis.;. The aortic valve opens well. No hemodynamically significant valvula
r aortic

stenosis. Trace aortic regurgitation.

Pulmonic Valve

The pulmonic valve is not well seen, but is grossly normal. Mild pulmonic valvular regurgitation.

Great Vessels

The aortic root is normal size.

Pericardium/Pleura

There is no pericardial effusion.



______________________________________________________________________________



Interpretation Summary

Left ventricular systolic function is normal.

Ejection Fraction = 60-65%.

E/A reversal consistent with but not diagnostic of poor LV compliance

The right ventricular systolic function is grossly normal.

There is mild mitral annular calcification.

There is mild mitral valve thickening.

There is mild mitral regurgitation.

There is trace tricuspid regurgitation.

There is mild aortic sclerosis.;

Trace aortic regurgitation.

Mild pulmonic valvular regurgitation.

There is no pericardial effusion.





MD Todd Castro 2019 11:45 AM

## 2019-01-29 NOTE — PN
Progress Note (short form)





- Note


Progress Note: 





ID consult dictated





86 yo man with NIDDM, chronic wound left leg for "months", he thinks at least 

since May


s/p revascularization of the LLE by Dr Santos, followed by debridement of 

necrotic achilles tendon 12/10


he developed cellulitis at the site and was treated with augmentin in January


the wound remained necrotic and he was admitted 1/28 for excisional debridement 

of the fascia/tendon


he was discharged home on clindamycin but could not get upstairs to his apt so 

he returned to the ED and was admitted


no fevers


+fatigue





I cannot examine the leg which has postop dressing on it but I spoke with Dr Santos who feels iv antibotics while in hospital 


is reasonable





would agree with vancomycin/zosyn at this time 


f/u blood cultures


 woundto be examined in the am  surgery

















Problem List





- Problems


(1) Infected wound


Code(s): T14.8XXA - OTHER INJURY OF UNSPECIFIED BODY REGION, INITIAL ENCOUNTER; 

L08.9 - LOCAL INFECTION OF THE SKIN AND SUBCUTANEOUS TISSUE, UNSP   





(2) Gross hematuria


Code(s): R31.0 - GROSS HEMATURIA

## 2019-01-29 NOTE — CON.GU
Consult


Consult Specialty:: 


Reason for Consultation:: 85 year old male presents with non healing leg ulcer





- History of Present Illness


Chief Complaint: gross hematuria


History of Present Illness: 





gross hematuria on admission. now is improved. no pain.  





- History Source


History Provided By: Patient


Limitations to Obtaining History: No Limitations





- Past Medical History


Cardio/Vascular: Yes: CAD, Hyperlipdemia, Other (PAD)


Renal/: Yes: BPH, Hematuria


Endocrine: Yes: Diabetes Mellitus, Hypothyroidism





- Alcohol/Substance Use


Hx Alcohol Use: No





- Smoking History


Smoking history: Never smoked


Have you smoked in the past 12 months: No





Home Medications





- Allergies


Allergies/Adverse Reactions: 


 Allergies











Allergy/AdvReac Type Severity Reaction Status Date / Time


 


No Known Allergies Allergy   Verified 01/28/19 21:59














- Home Medications


Home Medications: 


Ambulatory Orders





Levothyroxine [Synthroid -] 150 mcg PO DAILY 11/28/18 


Lipitor 40 mg PO DAILY 11/28/18 


Metformin HCl  mg PO TID 11/28/18 


Metoprolol Tartrate 25 mg PO DAILY 11/28/18 


Ergocalciferol [Vitamin D2] 50,000 unit PO SA 12/07/18 


Collagenase Clostridium Hist. [Santyl] 1 applic TP DAILY 30 Days #60 oint...g. 

12/12/18 


Clopidogrel Bisulfate [Plavix] 75 mg PO DAILY 01/29/19 











Review of Systems





- Review of Systems


Genitourinary: reports: Hematuria.  denies: Flank Pain, Incontinence, Pain





Physical Exam-


Vital Signs: 


 Vital Signs











Temperature  98.2 F   01/29/19 10:00


 


Pulse Rate  82   01/29/19 10:00


 


Respiratory Rate  18   01/29/19 10:00


 


Blood Pressure  111/57 L  01/29/19 10:00


 


O2 Sat by Pulse Oximetry (%)  97   01/29/19 08:36











Gastrointestinal: Yes: WNL, Normal Bowel Sounds


Renal/: No: Bladder Distention, CVA Tenderness - Left, CVA Tenderness - Right


Labs: 


 CBC, BMP





 01/29/19 06:30 





 01/29/19 06:30 











Problem List





- Problems


(1) Gross hematuria


Assessment/Plan: 


Ct scan ordered.  gross hematuria has resolved. cystoscopy as outpatient


Code(s): R31.0 - GROSS HEMATURIA

## 2019-01-29 NOTE — CONS
DATE OF CONSULTATION:  

 

DATE OF DICTATION:  01/29/2019

 

INFECTIOUS DISEASE CONSULTATION

 

REQUESTED BY:  Hospitalist Service.

 

This is an 85-year-old man with past medical history of diabetes, hypertension,

hyperlipidemia, hypothyroidism.  He is status post revascularization of the femoral

and tibial arteries of his left leg followed by debridement of a necrotic Achilles

tendon wound originally done December 10, subsequently developed a cellulitis of the

area, was started on Augmentin in January, was admitted on January 28 for excisional

debridement of the fascia and tendon of the same area.  He was discharged home on

clindamycin but was extremely weak and lightheaded and unable to ambulate, and he was

unable to walk up the stairs to his apartment, and his friend brought him back to the

emergency room.  He has no fevers or chills.  He has no nausea or vomiting.  He does

report since being on the Augmentin he has felt very weak.  I am asked to see him for

possible antibiotic use.

 

PAST MEDICAL HISTORY:  Notable for diabetes, hypertension, hyperlipidemia,

hypothyroidism, recent diagnosis of thyroid cancer in May 2018, status post

thyroidectomy at [facility].  He is status post a right hip placement and bilateral

knee replacements.

 

SOCIAL HISTORY:  There is no history of cigarette, alcohol, or substance use.  He

lives alone.

 

FAMILY HISTORY:  Notable for diabetes.

 

ALLERGIES:  He has no known drug allergies.

 

MEDICATIONS:  As an outpatient include levothyroxine, Lipitor, metformin, metoprolol,

aspirin, vitamin D, and Santyl.

 

REVIEW OF SYSTEMS:  Notable for fatigue.

 

PHYSICAL EXAMINATION: 

Vital Signs:  He is afebrile, temperature is 98.2, pulse of 82, blood pressure

111/57, respiratory rate is 18.  He is saturating 97% on room air.

HEENT:  He is normocephalic.  His eyes are anicteric.

Neck:  Supple.   His neck is well healed from the thyroid surgery.

Lungs:  Clear to auscultation.

Heart:  Regular rate and rhythm.

Abdomen:  Soft, nontender.

Extremities:  The left leg has a postoperative dressing.  I cannot exam it.  He can

wiggle his toes.

 

LABORATORIES:  Labs are notable for a white count of 10.7, hemoglobin 10.9, platelets

192.  BUN 16, creatinine 1.8.  Lactic acid was 2.5, repeat of 1.6.  Urinalysis is

notable for 67 white cells.  Blood cultures are pending.

 

Chest x-ray done on January 29 is unremarkable.

 

In summary, this is an elderly man status post excisional debridement of Achilles

tendon and fascia of his left lower extremity.  He reports by history that he has had

a chronic wound there that has progressively worsened leading to revascularization

and subsequent surgery.  I cannot examine the leg, which has a postoperative dressing

on it, but I spoke with Dr. Santos who feels IV antibiotics while in the hospital

would be reasonable at this point.  Would agree with vancomycin and Zosyn.  Followup

his blood cultures.  Hopefully, the wound can be examined in the morning.

 

 

JOSELITO BURRELL M.D.

 

[id number]

DD: 01/29/2019 14:34

DT: 01/29/2019 15:27

Job #:  [id number]

## 2019-01-29 NOTE — CONSULT
Consult - text type





- Consultation


Consultation Note: 





patient underwent debridement of left calf wound yesterday in [facility]. he was 

unable to climb stairs in his home and returned to ED> I was never called.





He is pain free, afebrile and no anemia.


Left calf dressing is clean and dry.





I will change dressing tomorrow.

## 2019-01-29 NOTE — PN
Physical Exam: 


SUBJECTIVE: Patient seen and examined at bedside this morning. 


Patient is an 85 year old male with past medical history of HTN, HLD, DM and 

Hypothyroidism, presented with weakness and lightheadedness after having a Left 

achilles tendon excisional debridement yesterday. Patient reported he was 

discharged home yesterday afternoon, and upon arrival at his apartment, felt 

weak, unable to climb the stairs. He was brought back to the hospital where he 

was noted to be tachycardic with low blood pressure. Of note, patient had this 

left foot wound for about 4 months. He had stent place on his LLE by Dr. Santos, followed by debridement. On 01/18, wound became infected and was 

given Augmentin. The cellulitis did not improve and patient underwent 

excisional debridement of the fascia/tendon (01/28). Patient was discharged 

home with Clindamycin. 


Patient also reported intermittent hematuria, of which he follows with Dr. Pena. He noted passage of blood clots the past few days, so scheduled 

an appointment for today. No gross hematuria noted in the urine today. Patient 

reports feeling better today, with no headache, dizziness, fever, chills, chest 

pain, SOB, palpitations. 





OBJECTIVE:





 Vital Signs











Temperature  98.2 F   01/29/19 10:00


 


Pulse Rate  82   01/29/19 10:00


 


Respiratory Rate  18   01/29/19 10:00


 


Blood Pressure  111/57 L  01/29/19 10:00


 


O2 Sat by Pulse Oximetry (%)  97   01/29/19 08:36











GENERAL: The patient is awake, alert, and fully oriented, in no acute distress.


HEAD: Normal with no signs of trauma.


EYES: PERRLA, EOMI, sclera anicteric, conjunctiva clear. 


LUNGS: Breath sounds equal, clear to auscultation bilaterally.


HEART: Regular rate and rhythm, S1, S2 without murmur, rub or gallop.


ABDOMEN: Soft, nontender, nondistended, normoactive bowel sounds.


EXTREMITIES: 2+ pulses, warm, well-perfused, no edema. LLE: surgical bandage 

from foot up to the knee


NEUROLOGICAL: Cranial nerves II through XII grossly intact. Normal speech, gait 

not observed.


PSYCH: Normal mood, normal affect.


SKIN: Warm, dry, normal turgor, no rashes or lesions noted














 Laboratory Results - last 24 hr











  01/29/19 01/29/19 01/29/19





  00:33 00:33 00:49


 


WBC  13.7 H  


 


RBC  3.61 L  


 


Hgb  11.0 L  


 


Hct  31.7 L  


 


MCV  87.9  


 


MCH  30.4  


 


MCHC  34.6  


 


RDW  13.9  


 


Plt Count  229  


 


MPV  7.7  


 


Absolute Neuts (auto)  12.0 H  


 


Neutrophils %  87.7 H  


 


Lymphocytes %  3.7 L D  


 


Monocytes %  7.9  


 


Eosinophils %  0.1  D  


 


Basophils %  0.6  


 


Nucleated RBC %  0  


 


Sodium   134 L 


 


Potassium   4.1 


 


Chloride   98 


 


Carbon Dioxide   29 


 


Anion Gap   7 L 


 


BUN   16 


 


Creatinine   1.1 


 


Creat Clearance w eGFR   > 60 


 


POC Glucometer   


 


Random Glucose   250 H 


 


Lactic Acid    2.5 H*


 


Calcium   7.9 L 


 


Phosphorus   


 


Magnesium   


 


Total Bilirubin   0.8 


 


AST   16 


 


ALT   18 


 


Alkaline Phosphatase   80 


 


Total Protein   6.3 L 


 


Albumin   2.6 L 


 


TSH   


 


Free T4   


 


Urine Color   


 


Urine Appearance   


 


Urine pH   


 


Ur Specific Gravity   


 


Urine Protein   


 


Urine Glucose (UA)   


 


Urine Ketones   


 


Urine Blood   


 


Urine Nitrite   


 


Urine Bilirubin   


 


Urine Urobilinogen   


 


Ur Leukocyte Esterase   


 


Urine WBC (Auto)   


 


Urine RBC (Auto)   


 


Ur Epithelial Cells   


 


Urine Mucus   














  01/29/19 01/29/19 01/29/19





  00:53 06:30 06:30


 


WBC   10.7 H 


 


RBC   3.52 L 


 


Hgb   10.9 L 


 


Hct   31.3 L 


 


MCV   88.8 


 


MCH   31.0 


 


MCHC   34.9 


 


RDW   13.9 


 


Plt Count   192 


 


MPV   7.8 


 


Absolute Neuts (auto)   8.8 H 


 


Neutrophils %   81.9 


 


Lymphocytes %   8.7  D 


 


Monocytes %   8.9 


 


Eosinophils %   0.2  D 


 


Basophils %   0.3 


 


Nucleated RBC %   0 


 


Sodium    134 L


 


Potassium    3.9


 


Chloride    99


 


Carbon Dioxide    29


 


Anion Gap    6 L


 


BUN    16


 


Creatinine    0.8


 


Creat Clearance w eGFR    > 60


 


POC Glucometer   


 


Random Glucose    194 H


 


Lactic Acid   


 


Calcium    7.9 L


 


Phosphorus    3.1


 


Magnesium    1.4 L


 


Total Bilirubin   


 


AST   


 


ALT   


 


Alkaline Phosphatase   


 


Total Protein   


 


Albumin   


 


TSH    1.27


 


Free T4   


 


Urine Color  Ltyellow  


 


Urine Appearance  Clear  


 


Urine pH  7.0  


 


Ur Specific Gravity  1.012  


 


Urine Protein  Negative  


 


Urine Glucose (UA)  Negative  


 


Urine Ketones  Negative  


 


Urine Blood  2+ H  


 


Urine Nitrite  Negative  


 


Urine Bilirubin  Negative  


 


Urine Urobilinogen  2.0  


 


Ur Leukocyte Esterase  Negative  


 


Urine WBC (Auto)  2  


 


Urine RBC (Auto)  67  


 


Ur Epithelial Cells  Rare  


 


Urine Mucus  Rare  














  01/29/19 01/29/19 01/29/19





  06:30 06:30 06:59


 


WBC   


 


RBC   


 


Hgb   


 


Hct   


 


MCV   


 


MCH   


 


MCHC   


 


RDW   


 


Plt Count   


 


MPV   


 


Absolute Neuts (auto)   


 


Neutrophils %   


 


Lymphocytes %   


 


Monocytes %   


 


Eosinophils %   


 


Basophils %   


 


Nucleated RBC %   


 


Sodium   


 


Potassium   


 


Chloride   


 


Carbon Dioxide   


 


Anion Gap   


 


BUN   


 


Creatinine   


 


Creat Clearance w eGFR   


 


POC Glucometer    198


 


Random Glucose   


 


Lactic Acid   1.6 


 


Calcium   


 


Phosphorus   


 


Magnesium   


 


Total Bilirubin   


 


AST   


 


ALT   


 


Alkaline Phosphatase   


 


Total Protein   


 


Albumin   


 


TSH   


 


Free T4  1.28 H  


 


Urine Color   


 


Urine Appearance   


 


Urine pH   


 


Ur Specific Gravity   


 


Urine Protein   


 


Urine Glucose (UA)   


 


Urine Ketones   


 


Urine Blood   


 


Urine Nitrite   


 


Urine Bilirubin   


 


Urine Urobilinogen   


 


Ur Leukocyte Esterase   


 


Urine WBC (Auto)   


 


Urine RBC (Auto)   


 


Ur Epithelial Cells   


 


Urine Mucus   








Active Medications











Generic Name Dose Route Start Last Admin





  Trade Name Freq  PRN Reason Stop Dose Admin


 


Heparin Sodium (Porcine)  5,000 unit  01/30/19 06:00  





  Heparin -  SQ   





  TID TRACI   





     





     





     





     


 


Piperacillin Sod/Tazobactam  50 mls @ 100 mls/hr  01/29/19 18:00  





  Sod 3.375 gm/ Dextrose  IVPB   





  Q8H-IV TRACI   





     





     





  Protocol   





     


 


Vancomycin HCl 1,500 mg/  500 mls @ 250 mls/hr  01/30/19 10:00  





  Dextrose  IVPB   





  Q24H TRACI   





     





     





  Protocol   





     


 


Insulin Aspart  1 vial  01/29/19 07:00  01/29/19 14:07





  Novolog Vial Sliding Scale -  SQ   Not Given





  ACHS TRACI   





     





     





  Protocol   





     











ASSESSMENT/PLAN:


Patient is an 85 year old male with past medical history of HTN, HLD, DM and 

Hypothyroidism, presented with weakness and lightheadedness after having a Left 

achilles tendon excisional debridement yesterday. Patient also reported 

intermittent hematuria, of which he follows with Dr. Pena.





#Left foot cellulitis s/p excisional debridement (01/28/19)


-Blood cx pending


-Lactic acidosis, resolved : 2.5 --> 1.6


-Will ask Dr. Santos if culture was sent


-ID (Dr. Mathis) consulted. Recommendations appreciated.


-Start IV Vanc/Zosyn at this time


-Wound to be examined in the morning by surgery. 


-Vascular surgery (Dr. Santos) consulted.





#Hematuria: r/o cancer


-UA: 2+Blood, 67 RBC


-Urology (Dr. Pena) consulted. Recommendations appreciated.


-CT abdomen and pelvis ordered.


-Cystoscopy as outpatient





#Hypertension


-Continue home Metoprolol 25mg daily.





#Hyperlipidemia


-Continue Lipitor 40mg daily





#DM


-Insulin sliding scale ACHS


-BGM ACHS





#Hypothyroidism


-Continue with Synthroid 150mcg daily


-TSH 1.27, T4 1.28





#FEN


-Not on any standing fluids


-HypoMg, repleted


-Routine bmp monitoring


-Diabetic diet





#Prophylaxis


-Heparin 5000 units sq tid





#Disposition


-full code








Visit type





- Emergency Visit


Emergency Visit: Yes


ED Registration Date: 01/28/19


Care time: The patient presented to the Emergency Department on the above date 

and was hospitalized for further evaluation of their emergent condition.





- New Patient


This patient is new to me today: Yes


Date on this admission: 01/29/19





- Critical Care


Critical Care patient: No

## 2019-01-29 NOTE — HP
CHIEF COMPLAINT: post -op L tendon excisional debridement unable to ambulate ; 





PCP:





HISTORY OF PRESENT ILLNESS:


83 y/o male PMH of DM, HTN, HLD, hypothyroidism presents to the ED after having 

a L achilles excisional debridement yesterday with Dr. Santos with 

complaints of feeling weak/lightheaded and unable to ambulate. Patients surgery 

ended around 4:40 pm and patient was discharged home to his apartment, of which 

is on the 3rd floor and he was unable to walk past the second flight of stairs 

so he had his friend bring him back to the hospital. Of  He was not in any pain 

however, he thought he was still feeling side effects from the sedation as he 

was feeling quite lethargic and dizzy.  Of note, patient was here on 1/18/2019 

was diagnosed with cellulitis of the left extremity and was discharged home 

with augmentin and with strict follow up with Dr. Santos, with whom he 

follows up with regularly. He completed the course of antibiotics, however, Dr Santos didn't feel the wound was healing properly so he felt the patient 

needed an I and D. Patient lives alone and has a home health aid 3 times /week. 

he denies any sick contacts nor any recent travels. 





ER course was notable for:


(1) ; initial BP 95/63


(2) blood cx sent 


(3)





Recent Travel:


denies


PAST MEDICAL HISTORY:


see above


PAST SURGICAL HISTORY:


B/L hip replacement, 1 stent placed (5 years ago), knee replacement, 

thyroidectomy


Social History:


Smoking:denies


Alcohol:denies


Drugs: denies





Family History: father: DM


Allergies





No Known Allergies Allergy (Verified 01/28/19 21:59)


 








HOME MEDICATIONS:


 Home Medications











 Medication  Instructions  Recorded


 


Levothyroxine [Synthroid -] 150 mcg PO DAILY 11/28/18


 


Lipitor 40 mg PO DAILY 11/28/18


 


Metformin HCl  mg PO TID 11/28/18


 


Metoprolol Tartrate 25 mg PO BID 11/28/18


 


Aspirin [ASA -] 81 mg PO DAILY 12/07/18


 


Ergocalciferol [Vitamin D2] 50,000 unit PO SA 12/07/18


 


Collagenase Clostridium Hist. 1 applic TP DAILY 30 Days #60 12/12/18





[Santyl] oint...g. 


 


Clindamycin [Cleocin -] 300 mg PO TID #21 capsule 01/28/19








REVIEW OF SYSTEMS


CONSTITUTIONAL: 


Present: generalized weakness Absent:  fever, chills, diaphoresis, malaise, 

loss of appetite, weight change


HEENT: 


Absent:  rhinorrhea, nasal congestion, throat pain, throat swelling, difficulty 

swallowing, mouth swelling, ear pain, eye pain, visual changes


CARDIOVASCULAR: 


Absent: chest pain, syncope, palpitations, irregular heart rate, lightheadedness

, peripheral edema


RESPIRATORY: 


Absent: cough, shortness of breath, dyspnea with exertion, orthopnea, wheezing, 

stridor, hemoptysis


GASTROINTESTINAL:


Absent: abdominal pain, abdominal distension, nausea, vomiting, diarrhea, 

constipation, melena, hematochezia


GENITOURINARY: 


Absent: dysuria, frequency, urgency, hesitancy, hematuria, flank pain, genital 

pain


MUSCULOSKELETAL: 


Absent: myalgia, arthralgia, joint swelling, back pain, neck pain


SKIN: 


Absent: rash, itching, pallor


HEMATOLOGIC/IMMUNOLOGIC: 


Absent: easy bleeding, easy bruising, lymphadenopathy, frequent infections


ENDOCRINE:


Absent: unexplained weight gain, unexplained weight loss, heat intolerance, 

cold intolerance


NEUROLOGIC: 


Absent: headache, focal weakness or paresthesias, dizziness, unsteady gait, 

seizure, mental status changes, bladder or bowel incontinence


PSYCHIATRIC: 


Absent: anxiety, depression, suicidal or homicidal ideation, hallucinations.








PHYSICAL EXAMINATION


 Vital Signs - 24 hr











  01/28/19 01/28/19





  21:43 22:10


 


Temperature 98.0 F 98.0 F


 


Pulse Rate 128 H 


 


Pulse Rate [  94 H





Right Radial]  


 


Respiratory 16 18





Rate  


 


Blood Pressure 92/53 L 


 


Blood Pressure  110/60





[Left Arm]  


 


O2 Sat by Pulse 100 97





Oximetry (%)  











GENERAL: Awake, alert, slightly lethargic


EYES: no scleral icterus; EOMI; PEERLA


NECK:no JVD; no lymphadenopathy


LUNGS:CTA B/L; no rales/rhonchi or wheezing


HEART: tachycardic; regular rhythm normal S1 and S2 without murmur, rub or 

gallop.


ABDOMEN: Soft, nontender, not distended, normoactive bowel sounds, no guarding, 

no rebound, no masses.  No hepatomegaly or  splenomegaly. 


MUSCULOSKELETAL: Normal range of motion at all joints. No bony deformities or 

tenderness. No CVA tenderness.


EXTREMITIES: right extremity; warm; well-perfused no clubbing/cyanosis or edema

; L extremity: bandage intact/clean/dry/intact + pulses 


NEUROLOGICAL:  Cranial nerves II-XII intact. Normal speech. Normal gait.


PSYCHIATRIC: Cooperative. Good eye contact. Appropriate mood and affect.


SKIN: Warm, dry, normal turgor, no rashes or lesions noted, normal capillary 

refill. 





ASSESSMENT/PLAN:


86 y/o male PMH of DM, HTN, HLD, hypothyroidism who presents to ED shortly 

after being discharged post-op for a L achilles tendon excisional debridment 

with complaints of feeling lethargic and being unable to ambulate.





#Sepsis possibly 2/2 achilles wound?


-blood cx pending


-started on vanc/zosyn


-ID consulted


-given  bolus X1 thus far


-monitor hemodynamics


-lactic acid pending


-f/u UA


-given   bolus X1; to place on maintenance fluids








#HTN


-holding BP meds in light of hypotension 


-reassess in AM





#DM


-holding metformin


-ISS


-BGMS ACHS





#Hypothyroidism


 -c/w synthroid


-TSH and T4 pending





F/E/N


NS 


monitor electrolytes


diabetic diet 





Problem List





- Problem


(1) Cellulitis, leg


Code(s): L03.119 - CELLULITIS OF UNSPECIFIED PART OF LIMB   


Qualifiers: 


   Laterality: left   Qualified Code(s): L03.116 - Cellulitis of left lower 

limb   





Visit type





- Emergency Visit


Emergency Visit: Yes


ED Registration Date: 01/28/19


Care time: The patient presented to the Emergency Department on the above date 

and was hospitalized for further evaluation of their emergent condition.





- New Patient


This patient is new to me today: Yes


Date on this admission: 01/29/19





- Critical Care


Critical Care patient: No

## 2019-01-30 LAB
ANION GAP SERPL CALC-SCNC: 6 MMOL/L (ref 8–16)
BASOPHILS # BLD: 0.6 % (ref 0–2)
BUN SERPL-MCNC: 10 MG/DL (ref 7–18)
CALCIUM SERPL-MCNC: 7.5 MG/DL (ref 8.5–10.1)
CHLORIDE SERPL-SCNC: 102 MMOL/L (ref 98–107)
CO2 SERPL-SCNC: 29 MMOL/L (ref 21–32)
CREAT SERPL-MCNC: 0.7 MG/DL (ref 0.55–1.3)
DEPRECATED RDW RBC AUTO: 13.9 % (ref 11.9–15.9)
EOSINOPHIL # BLD: 2.3 % (ref 0–4.5)
GLUCOSE SERPL-MCNC: 144 MG/DL (ref 74–106)
HCT VFR BLD CALC: 29.7 % (ref 35.4–49)
HGB BLD-MCNC: 10.4 GM/DL (ref 11.7–16.9)
LYMPHOCYTES # BLD: 13.2 % (ref 8–40)
MAGNESIUM SERPL-MCNC: 1.7 MG/DL (ref 1.8–2.4)
MCH RBC QN AUTO: 30.6 PG (ref 25.7–33.7)
MCHC RBC AUTO-ENTMCNC: 34.9 G/DL (ref 32–35.9)
MCV RBC: 87.6 FL (ref 80–96)
MONOCYTES # BLD AUTO: 12.1 % (ref 3.8–10.2)
NEUTROPHILS # BLD: 71.8 % (ref 42.8–82.8)
PHOSPHATE SERPL-MCNC: 3.2 MG/DL (ref 2.5–4.9)
PLATELET # BLD AUTO: 200 K/MM3 (ref 134–434)
PMV BLD: 7.8 FL (ref 7.5–11.1)
POTASSIUM SERPLBLD-SCNC: 4 MMOL/L (ref 3.5–5.1)
RBC # BLD AUTO: 3.39 M/MM3 (ref 4–5.6)
SODIUM SERPL-SCNC: 137 MMOL/L (ref 136–145)
WBC # BLD AUTO: 5.7 K/MM3 (ref 4–10)

## 2019-01-30 RX ADMIN — INSULIN ASPART SCH UNIT: 100 INJECTION, SOLUTION INTRAVENOUS; SUBCUTANEOUS at 21:21

## 2019-01-30 RX ADMIN — INSULIN ASPART SCH: 100 INJECTION, SOLUTION INTRAVENOUS; SUBCUTANEOUS at 08:29

## 2019-01-30 RX ADMIN — PIPERACILLIN SODIUM,TAZOBACTAM SODIUM SCH MLS/HR: 3; .375 INJECTION, POWDER, FOR SOLUTION INTRAVENOUS at 09:50

## 2019-01-30 RX ADMIN — CLOPIDOGREL BISULFATE SCH MG: 75 TABLET, FILM COATED ORAL at 09:50

## 2019-01-30 RX ADMIN — LEVOTHYROXINE SODIUM SCH MCG: 75 TABLET ORAL at 06:24

## 2019-01-30 RX ADMIN — INSULIN ASPART SCH UNIT: 100 INJECTION, SOLUTION INTRAVENOUS; SUBCUTANEOUS at 12:11

## 2019-01-30 RX ADMIN — HEPARIN SODIUM SCH UNIT: 5000 INJECTION, SOLUTION INTRAVENOUS; SUBCUTANEOUS at 21:21

## 2019-01-30 RX ADMIN — INSULIN ASPART SCH UNIT: 100 INJECTION, SOLUTION INTRAVENOUS; SUBCUTANEOUS at 18:37

## 2019-01-30 RX ADMIN — HEPARIN SODIUM SCH UNIT: 5000 INJECTION, SOLUTION INTRAVENOUS; SUBCUTANEOUS at 15:18

## 2019-01-30 RX ADMIN — PIPERACILLIN SODIUM,TAZOBACTAM SODIUM SCH MLS/HR: 3; .375 INJECTION, POWDER, FOR SOLUTION INTRAVENOUS at 01:37

## 2019-01-30 RX ADMIN — HEPARIN SODIUM SCH UNIT: 5000 INJECTION, SOLUTION INTRAVENOUS; SUBCUTANEOUS at 06:22

## 2019-01-30 RX ADMIN — ATORVASTATIN CALCIUM SCH MG: 40 TABLET, FILM COATED ORAL at 21:21

## 2019-01-30 RX ADMIN — PIPERACILLIN SODIUM,TAZOBACTAM SODIUM SCH MLS/HR: 3; .375 INJECTION, POWDER, FOR SOLUTION INTRAVENOUS at 19:23

## 2019-01-30 RX ADMIN — VANCOMYCIN HYDROCHLORIDE SCH MLS/HR: 1.5 INJECTION, POWDER, LYOPHILIZED, FOR SOLUTION INTRAVENOUS at 12:05

## 2019-01-30 NOTE — PN
Physical Exam: 


SUBJECTIVE: Patient seen and examined at bedside this morning. No acute events 

overnight. Patient has no new complaints. Denies any fever, chills, leg pain, 

headache, dizziness, chest pain, SOB, abdominal pain, diarrhea, urinary 

symptoms. 








OBJECTIVE:





 Vital Signs











 Period  Temp  Pulse  Resp  BP Sys/Noguera  Pulse Ox


 


 Last 24 Hr  97 F-98.5 F    16-20  113-144/63-77  











GENERAL: The patient is awake, alert, and fully oriented, in no acute distress.


HEAD: Normal with no signs of trauma.


EYES: PERRLA, EOMI, sclera anicteric, conjunctiva clear. 


LUNGS: Breath sounds equal, clear to auscultation bilaterally.


HEART: Regular rate and rhythm, S1, S2 without murmur, rub or gallop.


ABDOMEN: Soft, nontender, nondistended, normoactive bowel sounds.


EXTREMITIES: 2+ pulses, warm, well-perfused, no edema. LLE: surgical bandage 

from foot up to the knee


NEUROLOGICAL: Cranial nerves II through XII grossly intact. Normal speech, gait 

not observed.


PSYCH: Normal mood, normal affect.


SKIN: Warm, dry, normal turgor, no rashes or lesions noted





 Laboratory Results - last 24 hr











  01/29/19 01/29/19 01/30/19





  17:46 21:04 06:00


 


WBC    5.7


 


RBC    3.39 L


 


Hgb    10.4 L


 


Hct    29.7 L


 


MCV    87.6


 


MCH    30.6


 


MCHC    34.9


 


RDW    13.9


 


Plt Count    200


 


MPV    7.8


 


Absolute Neuts (auto)    4.1


 


Neutrophils %    71.8


 


Lymphocytes %    13.2  D


 


Monocytes %    12.1 H


 


Eosinophils %    2.3  D


 


Basophils %    0.6


 


Nucleated RBC %    0


 


Sodium   


 


Potassium   


 


Chloride   


 


Carbon Dioxide   


 


Anion Gap   


 


BUN   


 


Creatinine   


 


Creat Clearance w eGFR   


 


POC Glucometer  201  160 


 


Random Glucose   


 


Calcium   


 


Phosphorus   


 


Magnesium   














  01/30/19 01/30/19 01/30/19





  06:00 06:20 12:03


 


WBC   


 


RBC   


 


Hgb   


 


Hct   


 


MCV   


 


MCH   


 


MCHC   


 


RDW   


 


Plt Count   


 


MPV   


 


Absolute Neuts (auto)   


 


Neutrophils %   


 


Lymphocytes %   


 


Monocytes %   


 


Eosinophils %   


 


Basophils %   


 


Nucleated RBC %   


 


Sodium  137  


 


Potassium  4.0  


 


Chloride  102  


 


Carbon Dioxide  29  


 


Anion Gap  6 L  


 


BUN  10  


 


Creatinine  0.7  


 


Creat Clearance w eGFR  > 60  


 


POC Glucometer   165  245


 


Random Glucose  144 H  


 


Calcium  7.5 L  


 


Phosphorus  3.2  


 


Magnesium  1.7 L  








Active Medications











Generic Name Dose Route Start Last Admin





  Trade Name Freq  PRN Reason Stop Dose Admin


 


Atorvastatin Calcium  40 mg  01/29/19 22:00  01/29/19 21:07





  Lipitor -  PO   40 mg





  HS TRACI   Administration





     





     





     





     


 


Clopidogrel Bisulfate  75 mg  01/30/19 10:00  01/30/19 09:50





  Plavix -  PO   75 mg





  DAILY TRACI   Administration





     





     





     





     


 


Ergocalciferol  50,000 unit  02/02/19 10:00  





  Drisdol -  PO   





  Sa@1000 TRACI   





     





     





     





     


 


Heparin Sodium (Porcine)  5,000 unit  01/30/19 06:00  01/30/19 06:22





  Heparin -  SQ   5,000 unit





  TID TRACI   Administration





     





     





     





     


 


Piperacillin Sod/Tazobactam  50 mls @ 100 mls/hr  01/29/19 18:00  01/30/19 09:50





  Sod 3.375 gm/ Dextrose  IVPB   100 mls/hr





  Q8H-IV TRACI   Administration





     





     





  Protocol   





     


 


Vancomycin HCl 1,500 mg/  500 mls @ 250 mls/hr  01/30/19 10:00  01/30/19 12:05





  Dextrose  IVPB   250 mls/hr





  Q24H TRACI   Administration





     





     





  Protocol   





     


 


Insulin Aspart  1 vial  01/29/19 07:00  01/30/19 12:11





  Novolog Vial Sliding Scale -  SQ   4 unit





  ACHS TRACI   Administration





     





     





  Protocol   





     


 


Levothyroxine Sodium  150 mcg  01/30/19 07:00  01/30/19 06:24





  Synthroid -  PO   150 mcg





  AM TRACI   Administration





     





     





     





     


 


Metoprolol Succinate  25 mg  01/30/19 10:00  01/30/19 09:50





  Toprol Xl -  PO   25 mg





  DAILY TRACI   Administration





     





     





     





     








-CXR: No evidence of acute pulmonary disease


-Echo: LV systolic function is normal. EF = 60-65%. E/A reversal consistent 

with but not diagnostic of poor LV compliance. The RV systolic function is 

grossly normal. Mild mitral annular calcification. Mild mitral valve 

thickening. Mild MR. Trace TR. Mild aortic sclerosis. Trace AR. Mild pulmonic 

valvular regurgitation. No pericardial effusion. 





ASSESSMENT/PLAN:


Patient is an 85 year old male with past medical history of HTN, HLD, DM and 

Hypothyroidism, presented with weakness and lightheadedness after having a Left 

achilles tendon excisional debridement yesterday. Patient also reported 

intermittent hematuria, of which he follows with Dr. Pena. 





#Left foot cellulitis s/p excisional debridement (01/28/19)


-Blood cx - no growth for 24 hours


-Lactic acidosis, resolved : 2.5 --> 1.6


-ID (Dr. Mathis) consulted. Recommendations appreciated.


-Start IV Vancomycin 1500mg daily and Zosyn 3.375 q8h Day 2


-Vascular surgery (Dr. Santos) consulted. Recommendations appreciated.


-Dressing changed today


-Surgery PAs to place wound VAC tomorrow while on rounds





#Hematuria: r/o cancer


-UA: 2+Blood, 67 RBC


-Urology (Dr. Pena) consulted. Recommendations appreciated.


-CT abdomen and pelvis done


-Cystoscopy as outpatient





#Hypertension


-Continue home Metoprolol 25mg daily.





#Hyperlipidemia


-Continue Lipitor 40mg daily





#DM


-Insulin sliding scale ACHS


-BGM ACHS





#Hypothyroidism


-Continue with Synthroid 150mcg daily


-TSH 1.27, T4 1.28





#FEN


-Not on any standing fluids


-HypoMg, repleted


-Routine bmp monitoring


-Diabetic diet





#Prophylaxis


-Heparin 5000 units sq tid





#Disposition


-full code





Visit type





- Emergency Visit


Emergency Visit: Yes


ED Registration Date: 01/30/19


Care time: The patient presented to the Emergency Department on the above date 

and was hospitalized for further evaluation of their emergent condition.





- New Patient


This patient is new to me today: No





- Critical Care


Critical Care patient: No

## 2019-01-30 NOTE — PN
Progress Note (short form)





- Note


Progress Note: 





POD #2 s/p LLE debridement





Patient had procedure done as out-patient.


Unable to ascend stairs at home. Returned to [facility] ED.


Dressing changed on rounds today with Dr. Santos.





AVSS. Afebrile.





Surgery PAs to place wound VAC tomorrow while on rounds


Supplies ordered and ask that you place them at patient's bedside.


Thank you.

## 2019-01-30 NOTE — PN
Teaching Attending Note


Name of Resident: Christa Desouza





ATTENDING PHYSICIAN STATEMENT





I saw and evaluated the patient.


I reviewed the resident's note and discussed the case with the resident.


I agree with the resident's findings and plan as documented.








SUBJECTIVE:








OBJECTIVE:


 Vital Signs











Temperature  98.2 F   01/30/19 09:55


 


Pulse Rate  101 H  01/30/19 09:55


 


Respiratory Rate  18   01/30/19 09:55


 


Blood Pressure  144/77   01/30/19 09:55


 


O2 Sat by Pulse Oximetry (%)  97   01/29/19 08:36








  


 Initial Vital Signs











Temp Pulse Resp BP Pulse Ox


 


 98.0 F   128 H  16   92/53 L  100 


 


 01/28/19 21:43  01/28/19 21:43  01/28/19 21:43  01/28/19 21:43  01/28/19 21:43











 CBCD











WBC  5.7 K/mm3 (4.0-10.0)   01/30/19  06:00    


 


RBC  3.39 M/mm3 (4.00-5.60)  L  01/30/19  06:00    


 


Hgb  10.4 GM/dL (11.7-16.9)  L  01/30/19  06:00    


 


Hct  29.7 % (35.4-49)  L  01/30/19  06:00    


 


MCV  87.6 fl (80-96)   01/30/19  06:00    


 


MCHC  34.9 g/dl (32.0-35.9)   01/30/19  06:00    


 


RDW  13.9 % (11.9-15.9)   01/30/19  06:00    


 


Plt Count  200 K/MM3 (134-434)   01/30/19  06:00    


 


MPV  7.8 fl (7.5-11.1)   01/30/19  06:00    








GENERAL: The patient is awake, alert, and fully oriented, in no acute distress.


HEAD: Normal with no signs of trauma.


EYES: PERRLA, EOMI, sclera anicteric, conjunctiva clear. 


LUNGS: Breath sounds equal, clear to auscultation bilaterally.


HEART: Regular rate and rhythm, S1, S2 without murmur, rub or gallop.


ABDOMEN: Soft, nontender, nondistended, normoactive bowel sounds.


EXTREMITIES: 2+ pulses, warm, well-perfused, no edema. LLE: surgical bandage 

from foot up to the knee


NEUROLOGICAL: Cranial nerves II through XII grossly intact. Normal speech, gait 

not observed.


PSYCH: Normal mood, normal affect.


SKIN: Warm, dry, normal turgor, no rashes or lesions noted


 CMP











Sodium  137 mmol/L (136-145)   01/30/19  06:00    


 


Potassium  4.0 mmol/L (3.5-5.1)   01/30/19  06:00    


 


Chloride  102 mmol/L ()   01/30/19  06:00    


 


Carbon Dioxide  29 mmol/L (21-32)   01/30/19  06:00    


 


Anion Gap  6 MMOL/L (8-16)  L  01/30/19  06:00    


 


BUN  10 mg/dL (7-18)   01/30/19  06:00    


 


Creatinine  0.7 mg/dL (0.55-1.3)   01/30/19  06:00    


 


Creat Clearance w eGFR  > 60  (>60)   01/30/19  06:00    


 


Random Glucose  144 mg/dL ()  H  01/30/19  06:00    


 


Calcium  7.5 mg/dL (8.5-10.1)  L  01/30/19  06:00    


 


Total Bilirubin  0.8 mg/dL (0.2-1)   01/29/19  00:33    


 


AST  16 U/L (15-37)   01/29/19  00:33    


 


ALT  18 U/L (13-61)   01/29/19  00:33    


 


Alkaline Phosphatase  80 U/L ()   01/29/19  00:33    


 


Total Protein  6.3 g/dl (6.4-8.2)  L  01/29/19  00:33    


 


Albumin  2.6 g/dl (3.4-5.0)  L  01/29/19  00:33    








 Current Medications











Generic Name Dose Route Start Last Admin





  Trade Name Freq  PRN Reason Stop Dose Admin


 


Atorvastatin Calcium  40 mg  01/29/19 22:00  01/29/19 21:07





  Lipitor -  PO   40 mg





  HS TRACI   Administration





     





     





     





     


 


Clopidogrel Bisulfate  75 mg  01/30/19 10:00  01/30/19 09:50





  Plavix -  PO   75 mg





  DAILY TRACI   Administration





     





     





     





     


 


Ergocalciferol  50,000 unit  02/02/19 10:00  





  Drisdol -  PO   





  Sa@1000 TRACI   





     





     





     





     


 


Heparin Sodium (Porcine)  5,000 unit  01/30/19 06:00  01/30/19 06:22





  Heparin -  SQ   5,000 unit





  TID TRACI   Administration





     





     





     





     


 


Piperacillin Sod/Tazobactam  50 mls @ 100 mls/hr  01/29/19 18:00  01/30/19 09:50





  Sod 3.375 gm/ Dextrose  IVPB   100 mls/hr





  Q8H-IV TRACI   Administration





     





     





  Protocol   





     


 


Vancomycin HCl 1,500 mg/  500 mls @ 250 mls/hr  01/30/19 10:00  01/30/19 12:05





  Dextrose  IVPB   250 mls/hr





  Q24H TRACI   Administration





     





     





  Protocol   





     


 


Insulin Aspart  1 vial  01/29/19 07:00  01/30/19 12:11





  Novolog Vial Sliding Scale -  SQ   4 unit





  ACHS TRACI   Administration





     





     





  Protocol   





     


 


Levothyroxine Sodium  150 mcg  01/30/19 07:00  01/30/19 06:24





  Synthroid -  PO   150 mcg





  AM TRACI   Administration





     





     





     





     


 


Metoprolol Succinate  25 mg  01/30/19 10:00  01/30/19 09:50





  Toprol Xl -  PO   25 mg





  DAILY TRACI   Administration





     





     





     





     








 Home Medications











 Medication  Instructions  Recorded


 


Levothyroxine [Synthroid -] 150 mcg PO DAILY 11/28/18


 


Lipitor 40 mg PO DAILY 11/28/18


 


Metformin HCl  mg PO TID 11/28/18


 


Metoprolol Tartrate 25 mg PO DAILY 11/28/18


 


Ergocalciferol [Vitamin D2] 50,000 unit PO SA 12/07/18


 


Collagenase Clostridium Hist. 1 applic TP DAILY 30 Days #60 12/12/18





[Santyl] oint...g. 


 


Clopidogrel Bisulfate [Plavix] 75 mg PO DAILY 01/29/19














ASSESSMENT AND PLAN:


86 y/o gentleman with h/o HTN, HL, CAD, s/p stenting, b/l TKR, R hip 

replacement , Dm , hypothyroidism , and PVD who presented with generalized 

weakness  after L lower leg ulcer debridement. 





# Infected L calf Ulcer s/p excisional  debridment of tendons and fascia, dr. Santos on the case. on IV vancomycin and Zosyn as per ID.  





# Hematuria: Urology consult Dr. Leija 





# Dm : hold po meds and start SSI 





# h/o CAD :  continue home meds. 





 DVT Px: heparin will hold for hemauria if needed.

## 2019-01-30 NOTE — PATH
Surgical Pathology Report



Patient Name:  CARMEN ARORA

Accession #:  

[facility]. Rec. #:  [id number]                                                        

   /Age/Gender:  1933 (Age: 85) / M

Account:  [id number]                                                          

             Location: [facility] SURGICAL

Taken:  2019

Received:  2019

Reported:  2019

Physicians:  Cas Santos M.D.

  



Specimen(s) Received

 NECROTIC TISSUE LEFT LEG 





Clinical History

Left leg wound







Final Diagnosis

NECROTIC TISSUE, LEG, LEFT, DEBRIDEMENT:

SOFT TISSUE WITH MARKED ACUTE NECROTIZING INFLAMMATION.





***Electronically Signed***

Elisa Angel M.D.





Gross Description

Received in formalin labeled "necrotic tissue left leg," is a 7.0 x 5.0 x 2.8 cm

aggregate of tan gray, necrotic soft tissue. Representative sections are

submitted in one cassette.

DL/2019



saudi

## 2019-01-30 NOTE — OP
DATE OF OPERATION:  01/28/2919

 

SURGEON:  Cas Salguero M.D.

 

PROCEDURE:  Excisional debridement of fascia, tendon and muscle left posterior calf. 

 

 

PREOPERATIVE DIAGNOSIS:  Infected wound left calf.  

 

POSTOPERATIVE DIAGNOSIS:  Infected wound left calf.  

 

ANESTHESIA:  Fractional.  

 

ANESTHESIOLOGIST:  Lm Abdi M.D.

 

OPERATIVE FINDINGS:  There was fascial necrosis extending from the lower posterior

calf wound on the left proximally into the mid to upper calf.  

 

OPERATIVE PROCEDURE:  Following routine patient identification, he was placed in the

right lateral decubitus position.  Intravenous sedation was established.  The left

lower leg and foot were prepped with Betadine solution.  Timeout was performed. 

Lidocaine 1% was infiltrated along the posterior aspect of the calf extending

proximally from the open wound on the distal calf.  An incision was made over the

tract along the subcutaneous tissues and divided down to the necrotic fascia with

cautery.  Cautery and sharp dissection were used to excise grossly necrotic fascia,

tendon and muscle.  A curette was used to remove additional tissue.  The wound was

irrigated with dilute peroxide and saline and packed open with Iodoform gauze.  A

sterile wrap was applied and the patient was taken to the recovery room in stable

condition.  

 

 

CAS SALGUERO M.D.

 

[id number]

DD: 01/30/2019 08:59

DT: 01/30/2019 09:43

Job #:  [id number]

## 2019-01-31 LAB
ANION GAP SERPL CALC-SCNC: 5 MMOL/L (ref 8–16)
BUN SERPL-MCNC: 13 MG/DL (ref 7–18)
CALCIUM SERPL-MCNC: 7.7 MG/DL (ref 8.5–10.1)
CHLORIDE SERPL-SCNC: 105 MMOL/L (ref 98–107)
CO2 SERPL-SCNC: 27 MMOL/L (ref 21–32)
CREAT SERPL-MCNC: 0.8 MG/DL (ref 0.55–1.3)
DEPRECATED RDW RBC AUTO: 14 % (ref 11.9–15.9)
GLUCOSE SERPL-MCNC: 145 MG/DL (ref 74–106)
HCT VFR BLD CALC: 29.1 % (ref 35.4–49)
HGB BLD-MCNC: 10.3 GM/DL (ref 11.7–16.9)
MAGNESIUM SERPL-MCNC: 1.9 MG/DL (ref 1.8–2.4)
MCH RBC QN AUTO: 30.7 PG (ref 25.7–33.7)
MCHC RBC AUTO-ENTMCNC: 35.3 G/DL (ref 32–35.9)
MCV RBC: 87.1 FL (ref 80–96)
PHOSPHATE SERPL-MCNC: 3 MG/DL (ref 2.5–4.9)
PLATELET # BLD AUTO: 205 K/MM3 (ref 134–434)
PMV BLD: 7.7 FL (ref 7.5–11.1)
POTASSIUM SERPLBLD-SCNC: 4.1 MMOL/L (ref 3.5–5.1)
RBC # BLD AUTO: 3.34 M/MM3 (ref 4–5.6)
SODIUM SERPL-SCNC: 138 MMOL/L (ref 136–145)
WBC # BLD AUTO: 5.5 K/MM3 (ref 4–10)

## 2019-01-31 RX ADMIN — HEPARIN SODIUM SCH UNIT: 5000 INJECTION, SOLUTION INTRAVENOUS; SUBCUTANEOUS at 13:28

## 2019-01-31 RX ADMIN — ATORVASTATIN CALCIUM SCH MG: 40 TABLET, FILM COATED ORAL at 20:34

## 2019-01-31 RX ADMIN — CLOPIDOGREL BISULFATE SCH MG: 75 TABLET, FILM COATED ORAL at 10:16

## 2019-01-31 RX ADMIN — ATORVASTATIN CALCIUM SCH: 40 TABLET, FILM COATED ORAL at 21:08

## 2019-01-31 RX ADMIN — HEPARIN SODIUM SCH UNIT: 5000 INJECTION, SOLUTION INTRAVENOUS; SUBCUTANEOUS at 06:16

## 2019-01-31 RX ADMIN — INSULIN ASPART SCH: 100 INJECTION, SOLUTION INTRAVENOUS; SUBCUTANEOUS at 21:09

## 2019-01-31 RX ADMIN — HEPARIN SODIUM SCH UNIT: 5000 INJECTION, SOLUTION INTRAVENOUS; SUBCUTANEOUS at 20:34

## 2019-01-31 RX ADMIN — LEVOTHYROXINE SODIUM SCH MCG: 75 TABLET ORAL at 06:16

## 2019-01-31 RX ADMIN — INSULIN ASPART SCH UNIT: 100 INJECTION, SOLUTION INTRAVENOUS; SUBCUTANEOUS at 20:34

## 2019-01-31 RX ADMIN — HEPARIN SODIUM SCH: 5000 INJECTION, SOLUTION INTRAVENOUS; SUBCUTANEOUS at 21:08

## 2019-01-31 RX ADMIN — INSULIN ASPART SCH UNIT: 100 INJECTION, SOLUTION INTRAVENOUS; SUBCUTANEOUS at 11:49

## 2019-01-31 RX ADMIN — PIPERACILLIN SODIUM,TAZOBACTAM SODIUM SCH MLS/HR: 3; .375 INJECTION, POWDER, FOR SOLUTION INTRAVENOUS at 10:16

## 2019-01-31 RX ADMIN — INSULIN ASPART SCH UNIT: 100 INJECTION, SOLUTION INTRAVENOUS; SUBCUTANEOUS at 07:00

## 2019-01-31 RX ADMIN — PIPERACILLIN SODIUM,TAZOBACTAM SODIUM SCH MLS/HR: 3; .375 INJECTION, POWDER, FOR SOLUTION INTRAVENOUS at 01:49

## 2019-01-31 RX ADMIN — PIPERACILLIN SODIUM,TAZOBACTAM SODIUM SCH MLS/HR: 3; .375 INJECTION, POWDER, FOR SOLUTION INTRAVENOUS at 18:22

## 2019-01-31 RX ADMIN — VANCOMYCIN HYDROCHLORIDE SCH MLS/HR: 1.5 INJECTION, POWDER, LYOPHILIZED, FOR SOLUTION INTRAVENOUS at 11:26

## 2019-01-31 RX ADMIN — INSULIN ASPART SCH: 100 INJECTION, SOLUTION INTRAVENOUS; SUBCUTANEOUS at 18:23

## 2019-01-31 NOTE — PN
Physical Exam: 


SUBJECTIVE: Patient seen and examined at bedside this morning. No acute events 

overnight. VAC wound change done today by surgery. 








OBJECTIVE:





 Vital Signs











Temperature  98.3 F   01/31/19 15:24


 


Pulse Rate  83   01/31/19 15:24


 


Respiratory Rate  18   01/31/19 15:24


 


Blood Pressure  134/73   01/31/19 15:24


 


O2 Sat by Pulse Oximetry (%)  98   01/31/19 09:00














GENERAL: The patient is awake, alert, and fully oriented, in no acute distress.


HEAD: Normal with no signs of trauma.


NECK: Trachea midline, full range of motion, supple. 


LUNGS: Breath sounds equal, clear to auscultation bilaterally.


HEART: Regular rate and rhythm, S1, S2 without murmur, rub or gallop.


ABDOMEN: Soft, nontender, nondistended, normoactive bowel sounds.


EXTREMITIES: 2+ pulses, warm, well-perfused, no edema. 


NEUROLOGICAL: Cranial nerves II through XII grossly intact. Normal speech, gait 

not observed.


PSYCH: Normal mood, normal affect.


SKIN: Warm, dry, normal turgor, no rashes or lesions noted














 Laboratory Results - last 24 hr











  01/30/19 01/30/19 01/31/19





  18:25 21:17 05:36


 


WBC   


 


RBC   


 


Hgb   


 


Hct   


 


MCV   


 


MCH   


 


MCHC   


 


RDW   


 


Plt Count   


 


MPV   


 


Sodium   


 


Potassium   


 


Chloride   


 


Carbon Dioxide   


 


Anion Gap   


 


BUN   


 


Creatinine   


 


Creat Clearance w eGFR   


 


POC Glucometer  196  194  152


 


Random Glucose   


 


Calcium   


 


Phosphorus   


 


Magnesium   














  01/31/19 01/31/19 01/31/19





  06:00 06:00 11:47


 


WBC  5.5  


 


RBC  3.34 L  


 


Hgb  10.3 L  


 


Hct  29.1 L  


 


MCV  87.1  


 


MCH  30.7  


 


MCHC  35.3  


 


RDW  14.0  


 


Plt Count  205  


 


MPV  7.7  


 


Sodium   138 


 


Potassium   4.1 


 


Chloride   105 


 


Carbon Dioxide   27 


 


Anion Gap   5 L 


 


BUN   13 


 


Creatinine   0.8 


 


Creat Clearance w eGFR   > 60 


 


POC Glucometer    248


 


Random Glucose   145 H 


 


Calcium   7.7 L 


 


Phosphorus   3.0 


 


Magnesium   1.9 








Active Medications











Generic Name Dose Route Start Last Admin





  Trade Name Freq  PRN Reason Stop Dose Admin


 


Atorvastatin Calcium  40 mg  01/29/19 22:00  01/30/19 21:21





  Lipitor -  PO   40 mg





  HS TRACI   Administration





     





     





     





     


 


Clopidogrel Bisulfate  75 mg  01/30/19 10:00  01/31/19 10:16





  Plavix -  PO   75 mg





  DAILY TRACI   Administration





     





     





     





     


 


Ergocalciferol  50,000 unit  02/02/19 10:00  





  Drisdol -  PO   





  Sa@1000 TRACI   





     





     





     





     


 


Heparin Sodium (Porcine)  5,000 unit  01/30/19 06:00  01/31/19 13:28





  Heparin -  SQ   5,000 unit





  TID TRACI   Administration





     





     





     





     


 


Piperacillin Sod/Tazobactam  50 mls @ 100 mls/hr  01/29/19 18:00  01/31/19 10:16





  Sod 3.375 gm/ Dextrose  IVPB   100 mls/hr





  Q8H-IV TRACI   Administration





     





     





  Protocol   





     


 


Vancomycin HCl 1,500 mg/  500 mls @ 250 mls/hr  01/30/19 10:00  01/31/19 11:26





  Dextrose  IVPB   250 mls/hr





  Q24H TRACI   Administration





     





     





  Protocol   





     


 


Insulin Aspart  1 vial  01/29/19 07:00  01/31/19 11:49





  Novolog Vial Sliding Scale -  SQ   4 unit





  ACHS TRACI   Administration





     





     





  Protocol   





     


 


Levothyroxine Sodium  150 mcg  01/30/19 07:00  01/31/19 06:16





  Synthroid -  PO   150 mcg





  AM TRACI   Administration





     





     





     





     


 


Metoprolol Succinate  25 mg  01/30/19 10:00  01/31/19 10:16





  Toprol Xl -  PO   25 mg





  DAILY TRACI   Administration





     





     





     





     











-CXR: No evidence of acute pulmonary disease


-Echo: LV systolic function is normal. EF = 60-65%. E/A reversal consistent 

with but not diagnostic of poor LV compliance. The RV systolic function is 

grossly normal. Mild mitral annular calcification. Mild mitral valve 

thickening. Mild MR. Trace TR. Mild aortic sclerosis. Trace AR. Mild pulmonic 

valvular regurgitation. No pericardial effusion. 


-CT abdomen and pelvis: 2mm non-obstructing calculus within the right kidney. 

Bilateral renal cysts. No evidence of urinary tract masses or obstructive 

uropathy. No acute pathology within abdomen or pelvis. 





ASSESSMENT/PLAN:


Patient is an 85 year old male with past medical history of HTN, HLD, DM and 

Hypothyroidism, presented with weakness and lightheadedness after having a Left 

achilles tendon excisional debridement yesterday. Patient also reported 

intermittent hematuria, of which he follows with Dr. Pena. 





#Left foot cellulitis s/p excisional debridement (01/28/19)


-Blood cx - no growth for 24 hours


-Lactic acidosis, resolved : 2.5 --> 1.6


-ID (Dr. Mathis) consulted. Recommendations appreciated.


-IV Vancomycin 1500mg daily and Zosyn 3.375 q8h Day 3


-Would start PO Clindamycin upon discharge and follow-up with Dr. Santos.


-Vascular surgery (Dr. Santos) consulted. Recommendations appreciated.


-Wound vac placed on left posterior calf. 


- notified, VNS being set up for Saturday pending dispo





#Hematuria: r/o cancer


-UA on admission: 2+Blood, 67 RBC


-Urology (Dr. Pena) consulted. Recommendations appreciated.


-CT abdomen and pelvis: 2mm non-obstructing calculus within the right kidney. 

Bilateral renal cysts. No evidence of urinary tract masses or obstructive 

uropathy. No acute pathology within abdomen or pelvis. 


-Cystoscopy as outpatient





#Hypertension


-Continue home Metoprolol 25mg daily.





#Hyperlipidemia


-Continue Lipitor 40mg daily





#DM


-Insulin sliding scale ACHS


-BGM ACHS





#Hypothyroidism


-Continue with Synthroid 150mcg daily


-TSH 1.27, T4 1.28





#FEN


-Not on any standing fluids


-HypoMg, repleted


-Routine bmp monitoring


-Diabetic diet





#Prophylaxis


-Heparin 5000 units sq tid





#Disposition


-full code





Visit type





- Emergency Visit


Emergency Visit: Yes


ED Registration Date: 01/30/19


Care time: The patient presented to the Emergency Department on the above date 

and was hospitalized for further evaluation of their emergent condition.





- New Patient


This patient is new to me today: No





- Critical Care


Critical Care patient: No

## 2019-01-31 NOTE — PN
Progress Note (short form)





- Note


Progress Note: 


wound examined with surgery


vac placed





extensive wound, no drainage, no pus, no erythema


+exposed tendon





 Vital Signs











 Period  Temp  Pulse  Resp  BP Sys/Noguera  Pulse Ox


 


 Last 24 Hr  98.3 F-98.6 F  68-83  18-20  112-134/52-73  98














cor-rrr


llungs clear


abd soft,nt





wound as above





 CBC, BMP





 01/31/19 06:00 





 01/31/19 06:00 





a/p


s/p debridement of infected achilles tendon- no cultures available 


wound looks clean, would resume po clindamycin as per Dr Santos with f/u 

with him


d/w resident and surgical PA 


vac per surgery

















Problem List





- Problems


(1) Infected wound


Code(s): T14.8XXA - OTHER INJURY OF UNSPECIFIED BODY REGION, INITIAL ENCOUNTER; 

L08.9 - LOCAL INFECTION OF THE SKIN AND SUBCUTANEOUS TISSUE, UNSP   





(2) Gross hematuria


Code(s): R31.0 - GROSS HEMATURIA

## 2019-01-31 NOTE — PN
Teaching Attending Note


Name of Resident: Christa Desouza





ATTENDING PHYSICIAN STATEMENT





I saw and evaluated the patient.


I reviewed the resident's note and discussed the case with the resident.


I agree with the resident's findings and plan as documented.








SUBJECTIVE:


Patient is feeling better with no acute distress. 





OBJECTIVE:


 Vital Signs











Temperature  98.7 F   01/31/19 16:30


 


Pulse Rate  83   01/31/19 16:30


 


Respiratory Rate  18   01/31/19 16:30


 


Blood Pressure  115/67   01/31/19 16:30


 


O2 Sat by Pulse Oximetry (%)  98   01/31/19 09:00








GENERAL: The patient is awake, alert, and fully oriented, in no acute distress.


HEAD: Normal with no signs of trauma.


EYES: PERRLA, EOMI, sclera anicteric, conjunctiva clear. 


LUNGS: Breath sounds equal, clear to auscultation bilaterally.


HEART: Regular rate and rhythm, S1, S2 without murmur, rub or gallop.


ABDOMEN: Soft, nontender, nondistended, normoactive bowel sounds.


EXTREMITIES: 2+ pulses, warm, well-perfused, no edema. LLE: positve for wound 

vac. 


NEUROLOGICAL: Cranial nerves II through XII grossly intact. Normal speech, gait 

not observed.


PSYCH: Normal mood, normal affect.


SKIN: Warm, dry, normal turgor, no rashes or lesions noted


 CBCD











WBC  5.5 K/mm3 (4.0-10.0)   01/31/19  06:00    


 


RBC  3.34 M/mm3 (4.00-5.60)  L  01/31/19  06:00    


 


Hgb  10.3 GM/dL (11.7-16.9)  L  01/31/19  06:00    


 


Hct  29.1 % (35.4-49)  L  01/31/19  06:00    


 


MCV  87.1 fl (80-96)   01/31/19  06:00    


 


MCHC  35.3 g/dl (32.0-35.9)   01/31/19  06:00    


 


RDW  14.0 % (11.9-15.9)   01/31/19  06:00    


 


Plt Count  205 K/MM3 (134-434)   01/31/19  06:00    


 


MPV  7.7 fl (7.5-11.1)   01/31/19  06:00    








 CMP











Sodium  138 mmol/L (136-145)   01/31/19  06:00    


 


Potassium  4.1 mmol/L (3.5-5.1)   01/31/19  06:00    


 


Chloride  105 mmol/L ()   01/31/19  06:00    


 


Carbon Dioxide  27 mmol/L (21-32)   01/31/19  06:00    


 


Anion Gap  5 MMOL/L (8-16)  L  01/31/19  06:00    


 


BUN  13 mg/dL (7-18)   01/31/19  06:00    


 


Creatinine  0.8 mg/dL (0.55-1.3)   01/31/19  06:00    


 


Creat Clearance w eGFR  > 60  (>60)   01/31/19  06:00    


 


Random Glucose  145 mg/dL ()  H  01/31/19  06:00    


 


Calcium  7.7 mg/dL (8.5-10.1)  L  01/31/19  06:00    


 


Total Bilirubin  0.8 mg/dL (0.2-1)   01/29/19  00:33    


 


AST  16 U/L (15-37)   01/29/19  00:33    


 


ALT  18 U/L (13-61)   01/29/19  00:33    


 


Alkaline Phosphatase  80 U/L ()   01/29/19  00:33    


 


Total Protein  6.3 g/dl (6.4-8.2)  L  01/29/19  00:33    


 


Albumin  2.6 g/dl (3.4-5.0)  L  01/29/19  00:33    








 Current Medications











Generic Name Dose Route Start Last Admin





  Trade Name Santos  PRN Reason Stop Dose Admin


 


Atorvastatin Calcium  40 mg  01/29/19 22:00  01/30/19 21:21





  Lipitor -  PO   40 mg





  HS TRACI   Administration





     





     





     





     


 


Clopidogrel Bisulfate  75 mg  01/30/19 10:00  01/31/19 10:16





  Plavix -  PO   75 mg





  DAILY TRACI   Administration





     





     





     





     


 


Ergocalciferol  50,000 unit  02/02/19 10:00  





  Drisdol -  PO   





  Sa@1000 TRACI   





     





     





     





     


 


Heparin Sodium (Porcine)  5,000 unit  01/30/19 06:00  01/31/19 13:28





  Heparin -  SQ   5,000 unit





  TID TRACI   Administration





     





     





     





     


 


Piperacillin Sod/Tazobactam  50 mls @ 100 mls/hr  01/29/19 18:00  01/31/19 18:22





  Sod 3.375 gm/ Dextrose  IVPB   100 mls/hr





  Q8H-IV TRACI   Administration





     





     





  Protocol   





     


 


Vancomycin HCl 1,500 mg/  500 mls @ 250 mls/hr  01/30/19 10:00  01/31/19 11:26





  Dextrose  IVPB   250 mls/hr





  Q24H TRACI   Administration





     





     





  Protocol   





     


 


Insulin Aspart  1 vial  01/29/19 07:00  01/31/19 18:23





  Novolog Vial Sliding Scale -  SQ   Not Given





  ACHS TRACI   





     





     





  Protocol   





     


 


Levothyroxine Sodium  150 mcg  01/30/19 07:00  01/31/19 06:16





  Synthroid -  PO   150 mcg





  AM TRACI   Administration





     





     





     





     


 


Metoprolol Succinate  25 mg  01/30/19 10:00  01/31/19 10:16





  Toprol Xl -  PO   25 mg





  DAILY TRACI   Administration





     





     





     





     








 Home Medications











 Medication  Instructions  Recorded


 


Levothyroxine [Synthroid -] 150 mcg PO DAILY 11/28/18


 


Lipitor 40 mg PO DAILY 11/28/18


 


Metformin HCl  mg PO TID 11/28/18


 


Metoprolol Tartrate 25 mg PO DAILY 11/28/18


 


Ergocalciferol [Vitamin D2] 50,000 unit PO SA 12/07/18


 


Collagenase Clostridium Hist. 1 applic TP DAILY 30 Days #60 12/12/18





[Santyl] oint...g. 


 


Clopidogrel Bisulfate [Plavix] 75 mg PO DAILY 01/29/19














ASSESSMENT AND PLAN:


86 y/o gentleman with h/o HTN, HL, CAD, s/p stenting, b/l TKR, R hip 

replacement , Dm , hypothyroidism , and PVD who presented with generalized 

weakness  after L lower leg ulcer debridement. 





# Infected L calf Ulcer s/p excisional debridment of tendons and fascia by dr. Santos .continue iV vancomycin and Zosyn as per ID.  





# Hematuria: Urology consult Dr. Leija 





# Dm : hold po meds and start SSI 





# h/o CAD :  continue home meds. 





 DVT Px: heparin will hold for hemauria if needed.

## 2019-01-31 NOTE — PN
Progress Note (short form)





- Note


Progress Note: 

















Wound vac placed on L posterior calf. Black foam to wound bed with bridge 

medially/anteriorly on calf to avoid pressure point of diskette. 


Vac set to 125mmHg. 


Wound vac paperwork completed and faxed.


 notified, VNS being set up for Saturday pending d/c

## 2019-02-01 VITALS — TEMPERATURE: 97.8 F | HEART RATE: 91 BPM | DIASTOLIC BLOOD PRESSURE: 91 MMHG | SYSTOLIC BLOOD PRESSURE: 129 MMHG

## 2019-02-01 LAB
ANION GAP SERPL CALC-SCNC: 7 MMOL/L (ref 8–16)
BUN SERPL-MCNC: 15 MG/DL (ref 7–18)
CALCIUM SERPL-MCNC: 7.9 MG/DL (ref 8.5–10.1)
CHLORIDE SERPL-SCNC: 106 MMOL/L (ref 98–107)
CO2 SERPL-SCNC: 27 MMOL/L (ref 21–32)
CREAT SERPL-MCNC: 0.8 MG/DL (ref 0.55–1.3)
DEPRECATED RDW RBC AUTO: 13.8 % (ref 11.9–15.9)
GLUCOSE SERPL-MCNC: 174 MG/DL (ref 74–106)
HCT VFR BLD CALC: 29.7 % (ref 35.4–49)
HGB BLD-MCNC: 10.3 GM/DL (ref 11.7–16.9)
MAGNESIUM SERPL-MCNC: 1.9 MG/DL (ref 1.8–2.4)
MCH RBC QN AUTO: 30.7 PG (ref 25.7–33.7)
MCHC RBC AUTO-ENTMCNC: 34.8 G/DL (ref 32–35.9)
MCV RBC: 88.2 FL (ref 80–96)
PHOSPHATE SERPL-MCNC: 2.7 MG/DL (ref 2.5–4.9)
PLATELET # BLD AUTO: 224 K/MM3 (ref 134–434)
PMV BLD: 7.8 FL (ref 7.5–11.1)
POTASSIUM SERPLBLD-SCNC: 4.7 MMOL/L (ref 3.5–5.1)
RBC # BLD AUTO: 3.37 M/MM3 (ref 4–5.6)
SODIUM SERPL-SCNC: 140 MMOL/L (ref 136–145)
WBC # BLD AUTO: 5.8 K/MM3 (ref 4–10)

## 2019-02-01 RX ADMIN — PIPERACILLIN SODIUM,TAZOBACTAM SODIUM SCH MLS/HR: 3; .375 INJECTION, POWDER, FOR SOLUTION INTRAVENOUS at 09:05

## 2019-02-01 RX ADMIN — INSULIN ASPART SCH: 100 INJECTION, SOLUTION INTRAVENOUS; SUBCUTANEOUS at 18:05

## 2019-02-01 RX ADMIN — INSULIN ASPART SCH: 100 INJECTION, SOLUTION INTRAVENOUS; SUBCUTANEOUS at 13:23

## 2019-02-01 RX ADMIN — PIPERACILLIN SODIUM,TAZOBACTAM SODIUM SCH MLS/HR: 3; .375 INJECTION, POWDER, FOR SOLUTION INTRAVENOUS at 18:05

## 2019-02-01 RX ADMIN — HEPARIN SODIUM SCH UNIT: 5000 INJECTION, SOLUTION INTRAVENOUS; SUBCUTANEOUS at 05:57

## 2019-02-01 RX ADMIN — HEPARIN SODIUM SCH: 5000 INJECTION, SOLUTION INTRAVENOUS; SUBCUTANEOUS at 14:04

## 2019-02-01 RX ADMIN — PIPERACILLIN SODIUM,TAZOBACTAM SODIUM SCH MLS/HR: 3; .375 INJECTION, POWDER, FOR SOLUTION INTRAVENOUS at 00:58

## 2019-02-01 RX ADMIN — VANCOMYCIN HYDROCHLORIDE SCH MLS/HR: 1.5 INJECTION, POWDER, LYOPHILIZED, FOR SOLUTION INTRAVENOUS at 09:42

## 2019-02-01 RX ADMIN — CLOPIDOGREL BISULFATE SCH MG: 75 TABLET, FILM COATED ORAL at 09:41

## 2019-02-01 RX ADMIN — INSULIN ASPART SCH UNIT: 100 INJECTION, SOLUTION INTRAVENOUS; SUBCUTANEOUS at 05:59

## 2019-02-01 RX ADMIN — LEVOTHYROXINE SODIUM SCH MCG: 75 TABLET ORAL at 05:59

## 2019-02-01 NOTE — PN
Progress Note (short form)





- Note


Progress Note: 








Pt seen this morning. 


Wound vac functioning well with minimal serosanguinous drainage in reservoir. 


Pain controlled.


Per resident, plan for d/c later today pending VNS/wound vac supplies. 


Pt should f/u in the wound care office on Wednesday 2/6/18.

## 2019-02-01 NOTE — PN
Progress Note (short form)





- Note


Progress Note: 





CT scan with small stones.  


recommend cystoscopy as outpatient





Problem List





- Problems


(1) Gross hematuria


Code(s): R31.0 - GROSS HEMATURIA

## 2019-02-01 NOTE — DS
Physical Exam: 


SUBJECTIVE: Patient seen and examined at bedside this morning. No acute events 

overnight. Patient has no complaints. 








OBJECTIVE:





 Vital Signs











 Period  Temp  Pulse  Resp  BP Sys/Noguera  Pulse Ox


 


 Last 24 Hr  97.6 F-98.7 F  77-84  18-20  113-134/60-73  96








PHYSICAL EXAM





GENERAL: The patient is awake, alert, and fully oriented, in no acute distress.


HEAD: Normal with no signs of trauma.


NECK: Trachea midline, full range of motion, supple. 


LUNGS: Breath sounds equal, clear to auscultation bilaterally.


HEART: Regular rate and rhythm, S1, S2 without murmur, rub or gallop.


ABDOMEN: Soft, nontender, nondistended, normoactive bowel sounds.


EXTREMITIES: 2+ pulses, warm, well-perfused, no edema. +VAC at the left 

posterior LE


NEUROLOGICAL: Cranial nerves II through XII grossly intact. Normal speech, gait 

not observed.


PSYCH: Normal mood, normal affect.


SKIN: Warm, dry, normal turgor, no rashes or lesions noted





LABS


 Laboratory Results - last 24 hr











  01/31/19 01/31/19 02/01/19





  11:47 20:30 05:47


 


WBC   


 


RBC   


 


Hgb   


 


Hct   


 


MCV   


 


MCH   


 


MCHC   


 


RDW   


 


Plt Count   


 


MPV   


 


Sodium   


 


Potassium   


 


Chloride   


 


Carbon Dioxide   


 


Anion Gap   


 


BUN   


 


Creatinine   


 


Creat Clearance w eGFR   


 


POC Glucometer  248  276  191


 


Random Glucose   


 


Calcium   


 


Phosphorus   


 


Magnesium   














  02/01/19 02/01/19





  06:30 06:30


 


WBC  5.8 


 


RBC  3.37 L 


 


Hgb  10.3 L 


 


Hct  29.7 L 


 


MCV  88.2 


 


MCH  30.7 


 


MCHC  34.8 


 


RDW  13.8 


 


Plt Count  224 


 


MPV  7.8 


 


Sodium   140


 


Potassium   4.7


 


Chloride   106


 


Carbon Dioxide   27


 


Anion Gap   7 L


 


BUN   15


 


Creatinine   0.8


 


Creat Clearance w eGFR   > 60


 


POC Glucometer  


 


Random Glucose   174 H


 


Calcium   7.9 L


 


Phosphorus   2.7


 


Magnesium   1.9











HOSPITAL COURSE:





Date of Admission:01/30/19





Date of Discharge: 02/01/19





Patient is an 85 year old male with past medical history of HTN, HLD, DM and 

Hypothyroidism, presented with weakness and lightheadedness after having a Left 

achilles tendon excisional debridement. Patient was noted to have elevated 

lactic acid and was started on IV fluids. Vascular surgery and ID consulted. 

Patient was started on IV vancomycin and zosyn. Blood cultures were negative. 

Wound vac was placed on the left posterior calf and VNS was arranged for 

continued wound vac changes at home. 


Patient also reported intermittent hematuria, of which he follows with Dr. Pena. CT abdomen/pelvis revealed a kidney stone. Recommended patient to 

have a cystoscopy as outpatient. Patient was discharged with instructions to 

take Clindamycin for 7 days and to follow-up with Dr. Santos at the wound 

care clinic on 02/06/19.





Minutes to complete discharge: 38





Discharge Summary


Reason For Visit: CELLULITIS OF LOWER EXTREMITY,UNABLE TO WALK


Current Active Problems





Cellulitis, leg (Acute)


Gross hematuria (Acute)


Infected wound (Acute)


Unable to ambulate (Acute)








Condition: Improved





- Instructions


Diet, Activity, Other Instructions: 


Your visit


You were admitted to the hospital after you had debridement of your left leg 

wound.


You were seen by Dr. Santos and your wound was allowed to heal by doing VAC 

changes.


You will be discharged with a home visiting nurse to continue with wound care.


While here you were also seen by the infectious disease doctor and given IV 

antibiotics. 





You were also noted to have blood in your urine.


CAT scan was done which was negative of any acute concerns.


You were seen by the urologist and recommended that you follow-up with him for 

cystoscopy.





Medications


Please take the following medications as prescribed:


1. Clindamycin 300mg every 6 hours for 7 days.


2. Take Bacid 1 tab three times a day. 


3. Oxycodone 5mg every 6 hours as needed for pain. 





Continue your other home medications. 





Follow-up


-Please follow-up with your primary care doctor within 1 week.


-Please follow-up with Dr. Santos at the wound care clinic on Wednesday (02/ 06/19). 


-Follow-up with the urologist (Dr. Roman) within 2 weeks for a 

cystoscopy. Call his office to schedule an appointment. 





Additional info


Call Dr. Santos's office for any of the following:


-fever


-severe wound pain not relieved by medication


-excessive bleeding or drainage from the VAC or a need to change the VAC off-

schedule


 


Call 911 or go to the ED if with any worsening fever, chills, headache, 

dizziness, chest pain, shortness of breath, nausea, vomiting, abdominal pain, 

or any new concerns noted. 








Referrals: 


Tiffany Mathis MD [Staff Physician] - 


Nii Roman MD [Staff Physician] - 


Cas Santos MD [Staff Physician] - 02/06/19


Disposition: VNS/HOME HEALTH CARE





- Home Medications


Comprehensive Discharge Medication List: 


Ambulatory Orders





Levothyroxine [Synthroid -] 150 mcg PO DAILY 11/28/18 


Metformin HCl  mg PO TID 11/28/18 


Metoprolol Tartrate 25 mg PO DAILY 11/28/18 


Ergocalciferol [Vitamin D2] 50,000 unit PO SA 12/07/18 


Collagenase Clostridium Hist. [Santyl] 1 applic TP DAILY 30 Days #60 oint...g. 

12/12/18 


Clopidogrel Bisulfate [Plavix] 75 mg PO DAILY 01/29/19 


Atorvastatin Ca [Lipitor] 40 mg PO HS  tablet 02/01/19 


Clindamycin [Cleocin -] 300 mg PO Q6HPO #28 capsule 02/01/19 


Lactobacillus Acidophilus [Bacid -] 1 each PO TID #21 tab 02/01/19 


oxyCODONE HCL [Roxicodone -] 5 mg PO Q6H PRN 7 Days #10 tablet MDD 2 02/01/19 








This patient is new to me today: No


Emergency Visit: Yes


ED Registration Date: 01/30/19


Care time: The patient presented to the Emergency Department on the above date 

and was hospitalized for further evaluation of their emergent condition.


Critical Care patient: No





- Discharge Referral


Referred to [facility] No

## 2019-02-01 NOTE — PN
Teaching Attending Note


Name of Resident: Christa Desouza





ATTENDING PHYSICIAN STATEMENT





I saw and evaluated the patient.


I reviewed the resident's note and discussed the case with the resident.


I agree with the resident's findings and plan as documented.








SUBJECTIVE:


Patient is feeling better with no acute distress. 





OBJECTIVE:


 Vital Signs











Temperature  98.6 F   02/01/19 05:00


 


Pulse Rate  79   02/01/19 05:00


 


Respiratory Rate  20   02/01/19 05:00


 


Blood Pressure  129/69   02/01/19 05:00


 


O2 Sat by Pulse Oximetry (%)  96   01/31/19 20:45











GENERAL: The patient is awake, alert, and fully oriented, in no acute distress.


HEAD: Normal with no signs of trauma.


NECK: Trachea midline, full range of motion, supple. 


LUNGS: Breath sounds equal, clear to auscultation bilaterally.


HEART: Regular rate and rhythm, S1, S2 without murmur, rub or gallop.


ABDOMEN: Soft, nontender, nondistended, normoactive bowel sounds.


EXTREMITIES: 2+ pulses, warm, well-perfused, no edema. +VAC at the left 

posterior LE


NEUROLOGICAL: Cranial nerves II through XII grossly intact. Normal speech, gait 

not observed.


PSYCH: Normal mood, normal affect.


SKIN: Warm, dry, normal turgor, no rashes or lesions noted


 CBCD











WBC  5.8 K/mm3 (4.0-10.0)   02/01/19  06:30    


 


RBC  3.37 M/mm3 (4.00-5.60)  L  02/01/19  06:30    


 


Hgb  10.3 GM/dL (11.7-16.9)  L  02/01/19  06:30    


 


Hct  29.7 % (35.4-49)  L  02/01/19  06:30    


 


MCV  88.2 fl (80-96)   02/01/19  06:30    


 


MCHC  34.8 g/dl (32.0-35.9)   02/01/19  06:30    


 


RDW  13.8 % (11.9-15.9)   02/01/19  06:30    


 


Plt Count  224 K/MM3 (134-434)   02/01/19  06:30    


 


MPV  7.8 fl (7.5-11.1)   02/01/19  06:30    








 CMP











Sodium  140 mmol/L (136-145)   02/01/19  06:30    


 


Potassium  4.7 mmol/L (3.5-5.1)   02/01/19  06:30    


 


Chloride  106 mmol/L ()   02/01/19  06:30    


 


Carbon Dioxide  27 mmol/L (21-32)   02/01/19  06:30    


 


Anion Gap  7 MMOL/L (8-16)  L  02/01/19  06:30    


 


BUN  15 mg/dL (7-18)   02/01/19  06:30    


 


Creatinine  0.8 mg/dL (0.55-1.3)   02/01/19  06:30    


 


Creat Clearance w eGFR  > 60  (>60)   02/01/19  06:30    


 


Random Glucose  174 mg/dL ()  H  02/01/19  06:30    


 


Calcium  7.9 mg/dL (8.5-10.1)  L  02/01/19  06:30    


 


Total Bilirubin  0.8 mg/dL (0.2-1)   01/29/19  00:33    


 


AST  16 U/L (15-37)   01/29/19  00:33    


 


ALT  18 U/L (13-61)   01/29/19  00:33    


 


Alkaline Phosphatase  80 U/L ()   01/29/19  00:33    


 


Total Protein  6.3 g/dl (6.4-8.2)  L  01/29/19  00:33    


 


Albumin  2.6 g/dl (3.4-5.0)  L  01/29/19  00:33    








 Current Medications











Generic Name Dose Route Start Last Admin





  Trade Name Freq  PRN Reason Stop Dose Admin


 


Atorvastatin Calcium  40 mg  01/29/19 22:00  01/31/19 21:08





  Lipitor -  PO   Not Given





  HS [facility]   





     





     





     





     


 


Clopidogrel Bisulfate  75 mg  01/30/19 10:00  02/01/19 09:41





  Plavix -  PO   75 mg





  DAILY [facility]   Administration





     





     





     





     


 


Ergocalciferol  50,000 unit  02/02/19 10:00  





  Drisdol -  PO   





  Sa@1000 [facility]   





     





     





     





     


 


Heparin Sodium (Porcine)  5,000 unit  01/30/19 06:00  02/01/19 05:57





  Heparin -  SQ   5,000 unit





  TID [facility]   Administration





     





     





     





     


 


Piperacillin Sod/Tazobactam  50 mls @ 100 mls/hr  01/29/19 18:00  02/01/19 09:05





  Sod 3.375 gm/ Dextrose  IVPB   100 mls/hr





  Q8H-IV [facility]   Administration





     





     





  Protocol   





     


 


Vancomycin HCl 1,500 mg/  500 mls @ 250 mls/hr  01/30/19 10:00  02/01/19 09:42





  Dextrose  IVPB   250 mls/hr





  Q24H [facility]   Administration





     





     





  Protocol   





     


 


Insulin Aspart  1 vial  01/29/19 07:00  02/01/19 05:59





  Novolog Vial Sliding Scale -  SQ   2 unit





  ACHS [facility]   Administration





     





     





  Protocol   





     


 


Levothyroxine Sodium  150 mcg  01/30/19 07:00  02/01/19 05:59





  Synthroid -  PO   150 mcg





  AM [facility]   Administration





     





     





     





     


 


Metoprolol Succinate  25 mg  01/30/19 10:00  02/01/19 09:41





  Toprol Xl -  PO   25 mg





  DAILY [facility]   Administration





     





     





     





     








 Home Medications











 Medication  Instructions  Recorded


 


Levothyroxine [Synthroid -] 150 mcg PO DAILY 11/28/18


 


Metformin HCl  mg PO TID 11/28/18


 


Metoprolol Tartrate 25 mg PO DAILY 11/28/18


 


Ergocalciferol [Vitamin D2] 50,000 unit PO SA 12/07/18


 


Collagenase Clostridium Hist. 1 applic TP DAILY 30 Days #60 12/12/18





[Santyl] oint...g. 


 


Clopidogrel Bisulfate [Plavix] 75 mg PO DAILY 01/29/19


 


Atorvastatin Ca [Lipitor] 40 mg PO HS  tablet 02/01/19


 


Clindamycin [Cleocin -] 300 mg PO Q6HPO #28 capsule 02/01/19


 


Lactobacillus Acidophilus [Bacid -] 1 each PO TID #21 tab 02/01/19


 


oxyCODONE HCL [Roxicodone -] 5 mg PO Q6H PRN 7 Days #10 tablet 02/01/19





 MDD 2 














ASSESSMENT AND PLAN:


86 y/o gentleman with h/o HTN, HL, CAD, s/p stenting, b/l TKR, R hip 

replacement , Dm , hypothyroidism , and PVD who presented with generalized 

weakness  after L lower leg ulcer debridement. 





# Infected L calf Ulcer s/p excisional  debridment of tendons and fascia,by dr. Santos  ,s/p IV vancomycin and Zosyn , as per ID to discharge the patient 

home on oral clindamycin , VNS is arranged to set up the wound vac in the house 

in am.  





# Hematuria: Urology consult Dr. Liss guerrero , f/u with urologist 

as an outpatient.





# Dm : continue hoe meds.





# h/o CAD :  continue home meds.

## 2019-02-27 ENCOUNTER — HOSPITAL ENCOUNTER (INPATIENT)
Dept: HOSPITAL 74 - JASU-SURG | Age: 84
LOS: 13 days | Discharge: HOME HEALTH | DRG: 902 | End: 2019-03-12
Attending: INTERNAL MEDICINE | Admitting: INTERNAL MEDICINE
Payer: COMMERCIAL

## 2019-02-27 VITALS — BODY MASS INDEX: 26.9 KG/M2
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
## 2019-02-27 DIAGNOSIS — T14.90XA: ICD-10-CM

## 2019-02-27 DIAGNOSIS — N40.0: ICD-10-CM

## 2019-02-27 DIAGNOSIS — L03.116: ICD-10-CM

## 2019-02-27 DIAGNOSIS — E11.9: ICD-10-CM

## 2019-02-27 DIAGNOSIS — T14.8XXA: Primary | ICD-10-CM

## 2019-02-27 DIAGNOSIS — L08.89: ICD-10-CM
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
## 2019-02-27 DIAGNOSIS — E03.9: ICD-10-CM

## 2019-02-27 DIAGNOSIS — I10: ICD-10-CM
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
## 2019-02-27 DIAGNOSIS — E78.5: ICD-10-CM

## 2019-02-27 PROCEDURE — 3E10X8Z IRRIGATION OF SKIN AND MUCOUS MEMBRANES USING IRRIGATING SUBSTANCE: ICD-10-PCS | Performed by: PLASTIC SURGERY

## 2019-02-27 PROCEDURE — 0Y9J0ZZ DRAINAGE OF LEFT LOWER LEG, OPEN APPROACH: ICD-10-PCS | Performed by: PLASTIC SURGERY

## 2019-02-27 PROCEDURE — 0JBP0ZZ EXCISION OF LEFT LOWER LEG SUBCUTANEOUS TISSUE AND FASCIA, OPEN APPROACH: ICD-10-PCS | Performed by: PLASTIC SURGERY

## 2019-02-27 RX ADMIN — HEPARIN SODIUM SCH: 5000 INJECTION, SOLUTION INTRAVENOUS; SUBCUTANEOUS at 20:59

## 2019-02-27 RX ADMIN — HEPARIN SODIUM SCH UNIT: 5000 INJECTION, SOLUTION INTRAVENOUS; SUBCUTANEOUS at 20:36

## 2019-02-27 RX ADMIN — ATORVASTATIN CALCIUM SCH MG: 40 TABLET, FILM COATED ORAL at 20:36

## 2019-02-27 RX ADMIN — SODIUM CHLORIDE, POTASSIUM CHLORIDE, SODIUM LACTATE AND CALCIUM CHLORIDE SCH MLS/HR: 600; 310; 30; 20 INJECTION, SOLUTION INTRAVENOUS at 20:35

## 2019-02-27 RX ADMIN — ATORVASTATIN CALCIUM SCH: 40 TABLET, FILM COATED ORAL at 20:59

## 2019-02-27 NOTE — HP
Admitting History and Physical





- Primary Care Physician


PCP: Nica Saenz





- Admission


Chief Complaint: llex wound.  s/p debridement


History of Present Illness: 





85 y o


h/o htn, hld, dm


with llex wound ...chronic





- Past Medical History


Cardiovascular: Yes: CAD, Hyperlipdemia, Other (PAD)


Renal/: Yes: BPH, Hematuria


Endocrine: Yes: Diabetes Mellitus, Hypothyroidism





- Smoking History


Smoking history: Never smoked


Have you smoked in the past 12 months: No





- Alcohol/Substance Use


Hx Alcohol Use: No





Home Medications





- Allergies


Allergies/Adverse Reactions: 


 Allergies











Allergy/AdvReac Type Severity Reaction Status Date / Time


 


No Known Allergies Allergy   Verified 02/27/19 11:56














- Home Medications


Home Medications: 


Ambulatory Orders





Levothyroxine [Synthroid -] 150 mcg PO DAILY 11/28/18 


Metformin HCl  mg PO TID 11/28/18 


Metoprolol Tartrate 25 mg PO DAILY 11/28/18 


Ergocalciferol [Vitamin D2] 50,000 unit PO SA 12/07/18 


Clopidogrel Bisulfate [Plavix] 75 mg PO DAILY 01/29/19 


Atorvastatin Ca [Lipitor] 40 mg PO HS  tablet 02/01/19 


Lactobacillus Acidophilus [Bacid -] 1 each PO TID #21 tab 02/01/19 


oxyCODONE HCL [Roxicodone -] 5 mg PO Q6H PRN 7 Days #10 tablet MDD 2 02/01/19 


ASA - 81 mg PO DAILY 02/27/19 


Amox-Tr/K Cl [Augmentin 875-125mg Tablet -] 1 tab PO BID@0800,1730 #6 tablet 03/ 12/19 











Physical Examination


Vital Signs: 


 Vital Signs











Temperature  98.5 F   02/27/19 15:36


 


Pulse Rate  65   02/27/19 16:10


 


Respiratory Rate  18   02/27/19 16:10


 


Blood Pressure  109/57 L  02/27/19 16:10


 


O2 Sat by Pulse Oximetry (%)  98   02/27/19 16:10











Constitutional: Yes: No Distress


HENT: Yes: Atraumatic


Neck: Yes: Supple


Cardiovascular: Yes: Regular Rate and Rhythm


Respiratory: Yes: CTA Bilaterally


Gastrointestinal: Yes: Normal Bowel Sounds


Extremities: Yes: Other (llex wound...in dressing)


Edema: LLE: Trace


Peripheral Pulses WNL: Yes


Neurological: Yes: Alert, Oriented





Problem List





- Problems


(1) HTN (hypertension)


Assessment/Plan: 


on meds


monitor


Code(s): I10 - ESSENTIAL (PRIMARY) HYPERTENSION   





(2) HLD (hyperlipidemia)


Assessment/Plan: 


on meds


Code(s): E78.5 - HYPERLIPIDEMIA, UNSPECIFIED   





(3) Diabetes


Assessment/Plan: 


on po meds


monitor bgms


Code(s): E11.9 - TYPE 2 DIABETES MELLITUS WITHOUT COMPLICATIONS   





(4) Cellulitis, leg


Assessment/Plan: 


on iv abx


id and plastic surgery on board


Code(s): L03.119 - CELLULITIS OF UNSPECIFIED PART OF LIMB   





(5) Infected wound


Assessment/Plan: 


s/p debridement


wound care


dressing change


Code(s): T14.8XXA - OTHER INJURY OF UNSPECIFIED BODY REGION, INITIAL ENCOUNTER; 

L08.9 - LOCAL INFECTION OF THE SKIN AND SUBCUTANEOUS TISSUE, UNSP   





Assessment/Plan





Active Medications











Generic Name Dose Route Start Last Admin





  Trade Name Freq  PRN Reason Stop Dose Admin


 


Acetaminophen  650 mg  02/27/19 18:44  





  Tylenol -  PO   





  Q6H PRN   





  FEVER   





     





     





     


 


Aspirin  81 mg  02/28/19 10:00  





  Asa -  PO   





  DAILY [facility]   





     





     





     





     


 


Atorvastatin Calcium  40 mg  02/27/19 22:00  





  Lipitor -  PO   





  HS [facility]   





     





     





     





     


 


Clopidogrel Bisulfate  75 mg  02/28/19 10:00  





  Plavix -  PO   





  DAILY [facility]   





     





     





     





     


 


Fentanyl  25 mcg  02/27/19 15:53  





  Sublimaze Injection -  IVPUSH   





  [id number] PRN   





  PAIN-PACU ORDER X 4 DOSES ONLY   





     





     





     


 


Heparin Sodium (Porcine)  5,000 unit  02/27/19 22:00  





  Heparin -  SQ   





  BID [facility]   





     





     





     





     


 


Lactated Ringer's  1,000 mls @ 125 mls/hr  02/27/19 16:00  





  Lactated Ringers Solution  IV   





  ASDIR [facility]   





     





     





     





     


 


Levothyroxine Sodium  150 mcg  02/28/19 07:00  





  Synthroid -  PO   





  AM [facility]   





     





     





     





     


 


Metoprolol Tartrate  25 mg  02/28/19 10:00  





  Lopressor -  PO   





  DAILY [facility]   





     





     





     





     


 


Non-Formulary Medication  750 mg  02/27/19 22:00  





  Metformin Hcl Er  PO   





  TID [facility]   





     





     





     





     


 


Ondansetron HCl  4 mg  02/27/19 15:53  





  Zofran Injection  IVPUSH   





  Q6H PRN   





  NAUSEA AND/OR VOMITING   





     





     





     


 


Oxycodone HCl  10 mg  02/27/19 18:44  





  Roxicodone -  PO   





  Q6H PRN   





  PAIN

## 2019-02-28 LAB
ALBUMIN SERPL-MCNC: 2.9 G/DL (ref 3.4–5)
ALP SERPL-CCNC: 95 U/L (ref 45–117)
ALT SERPL-CCNC: 28 U/L (ref 13–61)
ANION GAP SERPL CALC-SCNC: 6 MMOL/L (ref 8–16)
AST SERPL-CCNC: 4 U/L (ref 15–37)
BASOPHILS # BLD: 0.2 % (ref 0–2)
BILIRUB SERPL-MCNC: 0.6 MG/DL (ref 0.2–1)
BUN SERPL-MCNC: 24 MG/DL (ref 7–18)
CALCIUM SERPL-MCNC: 8.2 MG/DL (ref 8.5–10.1)
CHLORIDE SERPL-SCNC: 102 MMOL/L (ref 98–107)
CO2 SERPL-SCNC: 29 MMOL/L (ref 21–32)
CREAT SERPL-MCNC: 1.1 MG/DL (ref 0.55–1.3)
DEPRECATED RDW RBC AUTO: 15.8 % (ref 11.9–15.9)
EOSINOPHIL # BLD: 0 % (ref 0–4.5)
GLUCOSE SERPL-MCNC: 228 MG/DL (ref 74–106)
HCT VFR BLD CALC: 31.6 % (ref 35.4–49)
HGB BLD-MCNC: 10.9 GM/DL (ref 11.7–16.9)
LYMPHOCYTES # BLD: 6.8 % (ref 8–40)
MCH RBC QN AUTO: 30.4 PG (ref 25.7–33.7)
MCHC RBC AUTO-ENTMCNC: 34.3 G/DL (ref 32–35.9)
MCV RBC: 88.5 FL (ref 80–96)
MONOCYTES # BLD AUTO: 6.5 % (ref 3.8–10.2)
NEUTROPHILS # BLD: 86.5 % (ref 42.8–82.8)
PLATELET # BLD AUTO: 141 K/MM3 (ref 134–434)
PMV BLD: 8.7 FL (ref 7.5–11.1)
POTASSIUM SERPLBLD-SCNC: 4.7 MMOL/L (ref 3.5–5.1)
PROT SERPL-MCNC: 7.1 G/DL (ref 6.4–8.2)
RBC # BLD AUTO: 3.57 M/MM3 (ref 4–5.6)
SODIUM SERPL-SCNC: 136 MMOL/L (ref 136–145)
WBC # BLD AUTO: 9.1 K/MM3 (ref 4–10)

## 2019-02-28 RX ADMIN — LEVOTHYROXINE SODIUM SCH MCG: 75 TABLET ORAL at 06:46

## 2019-02-28 RX ADMIN — METOPROLOL TARTRATE SCH MG: 25 TABLET, FILM COATED ORAL at 11:00

## 2019-02-28 RX ADMIN — SODIUM CHLORIDE, POTASSIUM CHLORIDE, SODIUM LACTATE AND CALCIUM CHLORIDE SCH MLS/HR: 600; 310; 30; 20 INJECTION, SOLUTION INTRAVENOUS at 04:44

## 2019-02-28 RX ADMIN — ATORVASTATIN CALCIUM SCH MG: 40 TABLET, FILM COATED ORAL at 21:27

## 2019-02-28 RX ADMIN — ASPIRIN 81 MG SCH MG: 81 TABLET ORAL at 11:37

## 2019-02-28 RX ADMIN — HEPARIN SODIUM SCH UNIT: 5000 INJECTION, SOLUTION INTRAVENOUS; SUBCUTANEOUS at 11:00

## 2019-02-28 RX ADMIN — CLOPIDOGREL BISULFATE SCH MG: 75 TABLET, FILM COATED ORAL at 11:00

## 2019-02-28 RX ADMIN — PIPERACILLIN SODIUM,TAZOBACTAM SODIUM SCH MLS/HR: 3; .375 INJECTION, POWDER, FOR SOLUTION INTRAVENOUS at 17:43

## 2019-02-28 RX ADMIN — HEPARIN SODIUM SCH UNIT: 5000 INJECTION, SOLUTION INTRAVENOUS; SUBCUTANEOUS at 21:27

## 2019-02-28 RX ADMIN — PIPERACILLIN SODIUM,TAZOBACTAM SODIUM SCH MLS/HR: 3; .375 INJECTION, POWDER, FOR SOLUTION INTRAVENOUS at 13:05

## 2019-02-28 NOTE — PN
Progress Note, Physician


History of Present Illness: 





comfortable





- Current Medication List


Current Medications: 


Active Medications





Acetaminophen (Tylenol -)  650 mg PO Q6H PRN


   PRN Reason: FEVER


Aspirin (Asa -)  81 mg PO DAILY [facility]


   Last Admin: 02/28/19 11:37 Dose:  81 mg


Atorvastatin Calcium (Lipitor -)  40 mg PO HS [facility]


   Last Admin: 02/27/19 20:59 Dose:  Not Given


Clopidogrel Bisulfate (Plavix -)  75 mg PO DAILY [facility]


   Last Admin: 02/28/19 11:00 Dose:  75 mg


Fentanyl (Sublimaze Injection -)  25 mcg IVPUSH [id number] PRN


   PRN Reason: PAIN-PACU ORDER X 4 DOSES ONLY


Heparin Sodium (Porcine) (Heparin -)  5,000 unit SQ BID [facility]


   Last Admin: 02/28/19 11:00 Dose:  5,000 unit


Lactated Ringer's (Lactated Ringers Solution)  1,000 mls @ 125 mls/hr IV ASDIR 

[facility]


   Last Admin: 02/28/19 04:44 Dose:  125 mls/hr


Piperacillin Sod/Tazobactam (Sod 3.375 gm/ Dextrose)  50 mls @ 100 mls/hr IVPB 

Q8H-IV [facility]; Protocol


   Last Admin: 02/28/19 13:05 Dose:  100 mls/hr


Levothyroxine Sodium (Synthroid -)  150 mcg PO AM [facility]


   Last Admin: 02/28/19 06:46 Dose:  150 mcg


Metformin HCl (Glucophage Xr -)  750 mg PO TIDAC [facility]


   Last Admin: 02/28/19 13:06 Dose:  750 mg


Metoprolol Tartrate (Lopressor -)  25 mg PO DAILY [facility]


   Last Admin: 02/28/19 11:00 Dose:  25 mg


Ondansetron HCl (Zofran Injection)  4 mg IVPUSH Q6H PRN


   PRN Reason: NAUSEA AND/OR VOMITING


Oxycodone HCl (Roxicodone -)  10 mg PO Q6H PRN


   PRN Reason: PAIN











- Objective


Vital Signs: 


 Vital Signs











Temperature  98.1 F   02/28/19 06:00


 


Pulse Rate  89   02/28/19 06:00


 


Respiratory Rate  20   02/28/19 06:00


 


Blood Pressure  124/68   02/28/19 06:00


 


O2 Sat by Pulse Oximetry (%)  97   02/27/19 21:33











Constitutional: Yes: No Distress


Eyes: Yes: Conjunctiva Clear


HENT: Yes: Atraumatic


Neck: Yes: Supple


Cardiovascular: Yes: Regular Rate and Rhythm


Respiratory: Yes: CTA Bilaterally


Gastrointestinal: Yes: Normal Bowel Sounds


Extremities: Yes: Other (llex wound/cellulitis)


Neurological: Yes: Alert, Oriented


Labs: 


 CBC, BMP





 02/28/19 06:26 





 02/28/19 06:26 











Problem List





- Problems


(1) HTN (hypertension)


Assessment/Plan: 


on meds


monitor


Code(s): I10 - ESSENTIAL (PRIMARY) HYPERTENSION   





(2) HLD (hyperlipidemia)


Assessment/Plan: 


on meds


Code(s): E78.5 - HYPERLIPIDEMIA, UNSPECIFIED   





(3) Diabetes


Assessment/Plan: 


on po meds


Code(s): E11.9 - TYPE 2 DIABETES MELLITUS WITHOUT COMPLICATIONS   





(4) Cellulitis, leg


Assessment/Plan: 


on iv abx


id and surgery on board


Code(s): L03.119 - CELLULITIS OF UNSPECIFIED PART OF LIMB   





(5) Infected wound


Assessment/Plan: 


s/p debridement


wound care


dressing change


Code(s): T14.8XXA - OTHER INJURY OF UNSPECIFIED BODY REGION, INITIAL ENCOUNTER; 

L08.9 - LOCAL INFECTION OF THE SKIN AND SUBCUTANEOUS TISSUE, UNSP

## 2019-02-28 NOTE — OP
DATE OF OPERATION:  

 

DATE OF DICTATION:  02/27/2019

 

PREOPERATIVE DIAGNOSIS:  Infected left lower legs.

 

POSTOPERATIVE DIAGNOSIS:  Infected left lower legs with necrotic fascia.

 

PROCEDURES:  

1.  Incision.

2.  Drainage of pus.

3.  Excisional debridement sharp of infected necrotic fascia and muscle.

4.  Pulsavac irrigation.

5.  Wound care.

 

SURGEON:  Ramona Montaño MD 

 

ANESTHESIA:  General.

 

HISTORY:  The patient is a pleasant 85-year-old diabetic male admitted by Dr. Santos in January 2019.  Underwent incision, drainage, and facetectomy for

infected left leg.  History of trauma to the lower leg, which then extended into

severe necrotic tissue with a large amount of pus.  The patient has been followed up

in the wound closure.  Has been on wound care.  Patient seen today with excessive

drainage, a large amount of necrotic tissue.  She is being taken to the operating

room on an urgent basis.

 

DESCRIPTION OF PROCEDURE:  The patient was brought to the operating room, turned onto

the left lateral position.  Left leg was washed and cleaned with Betadine soap and

solution then draped in a standard aseptic manner.  The patient was kept on a bean

bag.  General anesthesia was administered.  Standard time-out procedure was carried

out.  The patient was identified including the site.  Skin was prepped with Betadine

and then draped in a standard aseptic manner.  A 4- to 6-cm vertical incision was

made in the midline between the 2 _____ of gastrocnemius muscles.  It was extended

down to the level of the fascia.  Large amount of necrotic tissue was found.  This

was then debrided sharply.  Incision was kept in the midline.  Sural nerve was

located.  It was protected.  There were patches of necrotic tissue within the muscle,

which had to be excised.  A culture was taken.  Pulsavac was then used for massive

irrigation with 3 L of normal saline.  Dressing consisting of diluted solution of

Betadine and Kerlix was placed into the calf area and draped with a Kerlix.

 

PLAN OF CARE:  

1.  Admission to the hospital for a period.  

2.  Consultation from  _____ regarding IV antibiotics.  

3.  Admission under Dr. Nica Saenz.

 

FINAL DIAGNOSIS:  Diabetic patient with infected left calf tissues.

 

 

RAMONA MONTAÑO M.D.

 

[id number]

DD: 02/27/2019 16:08

DT: 02/28/2019 10:38

Job #:  [id number]

## 2019-02-28 NOTE — CON.ID
Consult


Consult Specialty:: infectious diseases


Referred by:: 


Reason for Consultation:: non healing wound and infected wound of the left leg





- History of Present Illness


Chief Complaint: non healing wound


History of Present Illness: 








85 y old male with h/o htn, hld, dm who has chronic wound and was taken to the 

operating room for surgery and debridement and according tot he surgeon patient 

had slough .


patients wound was on going for last 2 years and according to him has not 

healed.


patient had been n the hospital before for the wound infection and was treated 

for the same


currently he is post op from debridement





- History Source


History Provided By: Patient


Limitations to Obtaining History: No Limitations





- Past Medical History


Cardio/Vascular: Yes: CAD, Hyperlipdemia, Other (PAD)


Renal/: Yes: BPH, Hematuria


Endocrine: Yes: Diabetes Mellitus, Hypothyroidism





- Alcohol/Substance Use


Hx Alcohol Use: No





- Smoking History


Smoking history: Never smoked


Have you smoked in the past 12 months: No





Home Medications





- Allergies


Allergies/Adverse Reactions: 


 Allergies











Allergy/AdvReac Type Severity Reaction Status Date / Time


 


No Known Allergies Allergy   Verified 02/27/19 11:56














- Home Medications


Home Medications: 


Ambulatory Orders





Levothyroxine [Synthroid -] 150 mcg PO DAILY 11/28/18 


Metformin HCl  mg PO TID 11/28/18 


Metoprolol Tartrate 25 mg PO DAILY 11/28/18 


Ergocalciferol [Vitamin D2] 50,000 unit PO SA 12/07/18 


Clopidogrel Bisulfate [Plavix] 75 mg PO DAILY 01/29/19 


Atorvastatin Ca [Lipitor] 40 mg PO HS  tablet 02/01/19 


Lactobacillus Acidophilus [Bacid -] 1 each PO TID #21 tab 02/01/19 


oxyCODONE HCL [Roxicodone -] 5 mg PO Q6H PRN 7 Days #10 tablet MDD 2 02/01/19 


ASA - 81 mg PO DAILY 02/27/19 











Review of Systems





- Review of Systems


Constitutional: reports: No Symptoms


Eyes: reports: No Symptoms


HENT: reports: No Symptoms


Neck: reports: No Symptoms


Cardiovascular: reports: No Symptoms


Respiratory: reports: No Symptoms


Gastrointestinal: reports: No Symptoms


Genitourinary: reports: No Symptoms


Musculoskeletal: reports: No Symptoms


Integumentary: reports: Wound


Neurological: reports: No Symptoms


Endocrine: reports: No Symptoms


Hematology/Lymphatic: reports: No Symptoms


Psychiatric: reports: No Symptoms





Physical Exam


Vital Signs: 


 Vital Signs











Temperature  98.1 F   02/28/19 06:00


 


Pulse Rate  89   02/28/19 06:00


 


Respiratory Rate  20   02/28/19 06:00


 


Blood Pressure  124/68   02/28/19 06:00


 


O2 Sat by Pulse Oximetry (%)  97   02/27/19 21:33











Constitutional: Yes: Well Nourished, No Distress, Calm


Eyes: Yes: Conjunctiva Clear


HENT: Yes: Atraumatic, Normocephalic


Neck: Yes: Supple, Trachea Midline


Cardiovascular: Yes: Regular Rate and Rhythm


Respiratory: Yes: Regular, CTA Bilaterally


Gastrointestinal: Yes: Normal Bowel Sounds, Soft


Musculoskeletal: Yes: WNL


Extremities: Yes: Other (wound)


Wound/Incision: Yes: Dressing Dry and Intact


Neurological: Yes: Alert, Oriented


Psychiatric: Yes: Alert, Oriented


Labs: 


 CBC, BMP





 02/28/19 06:26 





 02/28/19 06:26 











Assessment/Plan





 Problem List 





- Problems


(1) HTN (hypertension)


Code(s): I10 - ESSENTIAL (PRIMARY) HYPERTENSION   





(2) HLD (hyperlipidemia)


Code(s): E78.5 - HYPERLIPIDEMIA, UNSPECIFIED   





(3) Diabetes


Code(s): E11.9 - TYPE 2 DIABETES MELLITUS WITHOUT COMPLICATIONS   





(4) Cellulitis, leg


Code(s): L03.119 - CELLULITIS OF UNSPECIFIED PART OF LIMB   





(5) Infected wound


Code(s): T14.8XXA - OTHER INJURY OF UNSPECIFIED BODY REGION, INITIAL ENCOUNTER; 

L08.9 - LOCAL INFECTION OF THE SKIN AND SUBCUTANEOUS TISSUE, UNSP   





plan


will start patient on zosyn


will await for cx reports to be back


wound care


will await final plan from plastics


rest as per the team

## 2019-03-01 RX ADMIN — CLOPIDOGREL BISULFATE SCH MG: 75 TABLET, FILM COATED ORAL at 10:35

## 2019-03-01 RX ADMIN — PIPERACILLIN SODIUM,TAZOBACTAM SODIUM SCH MLS/HR: 3; .375 INJECTION, POWDER, FOR SOLUTION INTRAVENOUS at 02:13

## 2019-03-01 RX ADMIN — PIPERACILLIN SODIUM,TAZOBACTAM SODIUM SCH MLS/HR: 3; .375 INJECTION, POWDER, FOR SOLUTION INTRAVENOUS at 17:25

## 2019-03-01 RX ADMIN — LEVOTHYROXINE SODIUM SCH MCG: 75 TABLET ORAL at 06:01

## 2019-03-01 RX ADMIN — PIPERACILLIN SODIUM,TAZOBACTAM SODIUM SCH MLS/HR: 3; .375 INJECTION, POWDER, FOR SOLUTION INTRAVENOUS at 10:36

## 2019-03-01 RX ADMIN — HEPARIN SODIUM SCH UNIT: 5000 INJECTION, SOLUTION INTRAVENOUS; SUBCUTANEOUS at 10:36

## 2019-03-01 RX ADMIN — HEPARIN SODIUM SCH UNIT: 5000 INJECTION, SOLUTION INTRAVENOUS; SUBCUTANEOUS at 22:19

## 2019-03-01 RX ADMIN — ASPIRIN 81 MG SCH MG: 81 TABLET ORAL at 10:35

## 2019-03-01 RX ADMIN — SODIUM CHLORIDE, POTASSIUM CHLORIDE, SODIUM LACTATE AND CALCIUM CHLORIDE SCH MLS/HR: 600; 310; 30; 20 INJECTION, SOLUTION INTRAVENOUS at 02:13

## 2019-03-01 RX ADMIN — METOPROLOL TARTRATE SCH MG: 25 TABLET, FILM COATED ORAL at 10:35

## 2019-03-01 RX ADMIN — ATORVASTATIN CALCIUM SCH MG: 40 TABLET, FILM COATED ORAL at 22:19

## 2019-03-01 NOTE — PN
Progress Note, Physician


History of Present Illness: 





patient post op doing well


no new issues


wound in dressing


wound cx noted





- Current Medication List


Current Medications: 


Active Medications





Acetaminophen (Tylenol -)  650 mg PO Q6H PRN


   PRN Reason: FEVER


Aspirin (Asa -)  81 mg PO DAILY [facility]


   Last Admin: 03/01/19 10:35 Dose:  81 mg


Atorvastatin Calcium (Lipitor -)  40 mg PO HS [facility]


   Last Admin: 02/28/19 21:27 Dose:  40 mg


Clopidogrel Bisulfate (Plavix -)  75 mg PO DAILY [facility]


   Last Admin: 03/01/19 10:35 Dose:  75 mg


Fentanyl (Sublimaze Injection -)  25 mcg IVPUSH [id number] PRN


   PRN Reason: PAIN-PACU ORDER X 4 DOSES ONLY


Heparin Sodium (Porcine) (Heparin -)  5,000 unit SQ BID [facility]


   Last Admin: 03/01/19 10:36 Dose:  5,000 unit


Lactated Ringer's (Lactated Ringers Solution)  1,000 mls @ 125 mls/hr IV ASDIR 

[facility]


   Last Admin: 03/01/19 02:13 Dose:  125 mls/hr


Piperacillin Sod/Tazobactam (Sod 3.375 gm/ Dextrose)  50 mls @ 100 mls/hr IVPB 

Q8H-IV [facility]; Protocol


   Last Admin: 03/01/19 10:36 Dose:  100 mls/hr


Levothyroxine Sodium (Synthroid -)  150 mcg PO AM [facility]


   Last Admin: 03/01/19 06:01 Dose:  150 mcg


Metformin HCl (Glucophage Xr -)  750 mg PO TIDAC [facility]


   Last Admin: 03/01/19 11:29 Dose:  750 mg


Metoprolol Tartrate (Lopressor -)  25 mg PO DAILY [facility]


   Last Admin: 03/01/19 10:35 Dose:  25 mg


Ondansetron HCl (Zofran Injection)  4 mg IVPUSH Q6H PRN


   PRN Reason: NAUSEA AND/OR VOMITING


Oxycodone HCl (Roxicodone -)  10 mg PO Q6H PRN


   PRN Reason: PAIN











- Objective


Vital Signs: 


 Vital Signs











Temperature  97.6 F   03/01/19 10:00


 


Pulse Rate  84   03/01/19 10:00


 


Respiratory Rate  18   03/01/19 10:00


 


Blood Pressure  120/70   03/01/19 10:00


 


O2 Sat by Pulse Oximetry (%)  96   02/28/19 21:00











Constitutional: Yes: No Distress, Calm


Cardiovascular: Yes: S1, S2


Respiratory: Yes: Regular, CTA Bilaterally


Gastrointestinal: Yes: Normal Bowel Sounds, Soft


Musculoskeletal: Yes: WNL


Extremities: Yes: Other


Wound/Incision: Yes: Dressing Dry and Intact


Neurological: Yes: Alert, Oriented


Psychiatric: Yes: Alert, Oriented


Labs: 


 CBC, BMP





 02/28/19 06:26 





 02/28/19 06:26 











Assessment/Plan








 Problem List 





- Problems


(1) HTN (hypertension)


Code(s): I10 - ESSENTIAL (PRIMARY) HYPERTENSION   





(2) HLD (hyperlipidemia)


Code(s): E78.5 - HYPERLIPIDEMIA, UNSPECIFIED   





(3) Diabetes


Code(s): E11.9 - TYPE 2 DIABETES MELLITUS WITHOUT COMPLICATIONS   





(4) Cellulitis, leg


Code(s): L03.119 - CELLULITIS OF UNSPECIFIED PART OF LIMB   





(5) Infected wound


Code(s): T14.8XXA - OTHER INJURY OF UNSPECIFIED BODY REGION, INITIAL ENCOUNTER; 

L08.9 - LOCAL INFECTION OF THE SKIN AND SUBCUTANEOUS TISSUE, UNSP   





plan


wound cx noted probably skin 


patient doing well


await for plastics to see the patient


will discuss with plastics about the patients wounds


leaning towards no abx on discharge

## 2019-03-01 NOTE — PATH
Surgical Pathology Report



Patient Name:  CARMEN ARORA

Accession #:  [id number]

Med. Rec. #:  [id number]                                                        

   /Age/Gender:  1933 (Age: 85) / M

Account:  [id number]                                                          

             Location: [facility] MED/SURG

Taken:  2019

Received:  2019

Reported:  3/1/2019

Physicians:  Tiara Holden M.D.

  



Specimen(s) Received

 DEBRIDED TISSUE LEFT LEG 





Clinical History

Wound left leg







Final Diagnosis

DEBRIDED TISSUE, LEFT LEG, EXCISION:

FIBROCONNECTIVE TISSUE WITH NECROSIS, SEVERE ACUTE AND CHRONIC INFLAMMATION,

ABSCESS AND GRANULATION TISSUE FORMATION.





***Electronically Signed***

Melody Evans M.D.





Gross Description

Received in formalin labeled "debrided tissue left leg," is a 2.0 x 1.8 x 0.4 cm

aggregate of tan gray, necrotic portions of soft tissue. A representative

portion is submitted in one cassette.

## 2019-03-01 NOTE — PN
Progress Note (short form)





- Note


Progress Note: 





Post Op 2 Excisional debrietment L leg , diabetic male Bl Sugar 228


 Selected Entries











  02/27/19





  11:34


 


Temperature 98 F


 


Pulse Rate 70


 


Respiratory 20





Rate 


 


Blood Pressure 130/80


 


Blood Pressure 96





Mean 








 Laboratory Tests











  02/28/19 02/28/19





  06:26 06:26


 


WBC  9.1 


 


Hgb  10.9 L 


 


Hct  31.6 L 


 


Neutrophils %  86.5 H D 


 


Lymphocytes %  6.8 L D 


 


Anion Gap   6 L


 


BUN   24 H


 


Random Glucose   228 H


 


Calcium   8.2 L








Dressing removed:


Odor has decreased , drainage is bloody, wound less necrotic tissue





Plan 


Start VAC x72 hours


Possible Skin graft if no adverse symptoms post VAC Application


C&S Strep Viridans... minimal (organism which affects Graft "Take" healing)


Will observe for drainage increase


Fever, 


Increase in pain


If none Plan : Skin grafting Tuesday

## 2019-03-01 NOTE — PN
Progress Note, Physician


History of Present Illness: 





comfortable





- Current Medication List


Current Medications: 


Active Medications





Acetaminophen (Tylenol -)  650 mg PO Q6H PRN


   PRN Reason: FEVER


Aspirin (Asa -)  81 mg PO DAILY [facility]


   Last Admin: 03/01/19 10:35 Dose:  81 mg


Atorvastatin Calcium (Lipitor -)  40 mg PO HS [facility]


   Last Admin: 02/28/19 21:27 Dose:  40 mg


Clopidogrel Bisulfate (Plavix -)  75 mg PO DAILY [facility]


   Last Admin: 03/01/19 10:35 Dose:  75 mg


Fentanyl (Sublimaze Injection -)  25 mcg IVPUSH [id number] PRN


   PRN Reason: PAIN-PACU ORDER X 4 DOSES ONLY


Heparin Sodium (Porcine) (Heparin -)  5,000 unit SQ BID [facility]


   Last Admin: 03/01/19 10:36 Dose:  5,000 unit


Lactated Ringer's (Lactated Ringers Solution)  1,000 mls @ 125 mls/hr IV ASDIR 

[facility]


   Last Admin: 03/01/19 02:13 Dose:  125 mls/hr


Piperacillin Sod/Tazobactam (Sod 3.375 gm/ Dextrose)  50 mls @ 100 mls/hr IVPB 

Q8H-IV [facility]; Protocol


   Last Admin: 03/01/19 17:25 Dose:  100 mls/hr


Levothyroxine Sodium (Synthroid -)  150 mcg PO AM [facility]


   Last Admin: 03/01/19 06:01 Dose:  150 mcg


Metformin HCl (Glucophage Xr -)  750 mg PO TIDAC [facility]


   Last Admin: 03/01/19 17:25 Dose:  750 mg


Metoprolol Tartrate (Lopressor -)  25 mg PO DAILY [facility]


   Last Admin: 03/01/19 10:35 Dose:  25 mg


Ondansetron HCl (Zofran Injection)  4 mg IVPUSH Q6H PRN


   PRN Reason: NAUSEA AND/OR VOMITING


Oxycodone HCl (Roxicodone -)  10 mg PO Q6H PRN


   PRN Reason: PAIN











- Objective


Vital Signs: 


 Vital Signs











Temperature  97.6 F   03/01/19 10:00


 


Pulse Rate  84   03/01/19 10:00


 


Respiratory Rate  18   03/01/19 10:00


 


Blood Pressure  120/70   03/01/19 10:00


 


O2 Sat by Pulse Oximetry (%)  96   03/01/19 09:00











Constitutional: Yes: No Distress


HENT: Yes: Atraumatic


Neck: Yes: Supple


Cardiovascular: Yes: Regular Rate and Rhythm


Respiratory: Yes: CTA Bilaterally


Extremities: Yes: Other (llex in dressing wound vac)


Neurological: Yes: Alert, Oriented


Labs: 


 CBC, BMP





 02/28/19 06:26 





 02/28/19 06:26 











Problem List





- Problems


(1) HTN (hypertension)


Assessment/Plan: 


on meds


monitor


Code(s): I10 - ESSENTIAL (PRIMARY) HYPERTENSION   





(2) HLD (hyperlipidemia)


Assessment/Plan: 


on meds


Code(s): E78.5 - HYPERLIPIDEMIA, UNSPECIFIED   





(3) Diabetes


Assessment/Plan: 


on po meds


monitor bgms


Code(s): E11.9 - TYPE 2 DIABETES MELLITUS WITHOUT COMPLICATIONS   





(4) Cellulitis, leg


Assessment/Plan: 


on iv abx


wound vac 





Code(s): L03.119 - CELLULITIS OF UNSPECIFIED PART OF LIMB   





(5) Infected wound


Assessment/Plan: 


s/p debridement


wound care


dressing change


Code(s): T14.8XXA - OTHER INJURY OF UNSPECIFIED BODY REGION, INITIAL ENCOUNTER; 

L08.9 - LOCAL INFECTION OF THE SKIN AND SUBCUTANEOUS TISSUE, UNSP

## 2019-03-02 LAB
ALBUMIN SERPL-MCNC: 3 G/DL (ref 3.4–5)
ALP SERPL-CCNC: 82 U/L (ref 45–117)
ALT SERPL-CCNC: 13 U/L (ref 13–61)
ANION GAP SERPL CALC-SCNC: 7 MMOL/L (ref 8–16)
AST SERPL-CCNC: 15 U/L (ref 15–37)
BASOPHILS # BLD: 1.2 % (ref 0–2)
BILIRUB SERPL-MCNC: 0.5 MG/DL (ref 0.2–1)
BUN SERPL-MCNC: 17 MG/DL (ref 7–18)
CALCIUM SERPL-MCNC: 8.2 MG/DL (ref 8.5–10.1)
CHLORIDE SERPL-SCNC: 103 MMOL/L (ref 98–107)
CO2 SERPL-SCNC: 30 MMOL/L (ref 21–32)
CREAT SERPL-MCNC: 1 MG/DL (ref 0.55–1.3)
DEPRECATED RDW RBC AUTO: 16.2 % (ref 11.9–15.9)
EOSINOPHIL # BLD: 5.8 % (ref 0–4.5)
GLUCOSE SERPL-MCNC: 114 MG/DL (ref 74–106)
HCT VFR BLD CALC: 35.8 % (ref 35.4–49)
HGB BLD-MCNC: 12.3 GM/DL (ref 11.7–16.9)
LYMPHOCYTES # BLD: 17.3 % (ref 8–40)
MCH RBC QN AUTO: 30.9 PG (ref 25.7–33.7)
MCHC RBC AUTO-ENTMCNC: 34.5 G/DL (ref 32–35.9)
MCV RBC: 89.7 FL (ref 80–96)
MONOCYTES # BLD AUTO: 12.8 % (ref 3.8–10.2)
NEUTROPHILS # BLD: 62.9 % (ref 42.8–82.8)
PLATELET # BLD AUTO: 168 K/MM3 (ref 134–434)
PMV BLD: 8.1 FL (ref 7.5–11.1)
POTASSIUM SERPLBLD-SCNC: 4.2 MMOL/L (ref 3.5–5.1)
PROT SERPL-MCNC: 6.4 G/DL (ref 6.4–8.2)
RBC # BLD AUTO: 3.99 M/MM3 (ref 4–5.6)
SODIUM SERPL-SCNC: 140 MMOL/L (ref 136–145)
WBC # BLD AUTO: 6 K/MM3 (ref 4–10)

## 2019-03-02 RX ADMIN — SODIUM CHLORIDE, POTASSIUM CHLORIDE, SODIUM LACTATE AND CALCIUM CHLORIDE SCH: 600; 310; 30; 20 INJECTION, SOLUTION INTRAVENOUS at 17:39

## 2019-03-02 RX ADMIN — ATORVASTATIN CALCIUM SCH: 40 TABLET, FILM COATED ORAL at 20:59

## 2019-03-02 RX ADMIN — HEPARIN SODIUM SCH: 5000 INJECTION, SOLUTION INTRAVENOUS; SUBCUTANEOUS at 20:59

## 2019-03-02 RX ADMIN — HEPARIN SODIUM SCH UNIT: 5000 INJECTION, SOLUTION INTRAVENOUS; SUBCUTANEOUS at 09:48

## 2019-03-02 RX ADMIN — ASPIRIN 81 MG SCH MG: 81 TABLET ORAL at 09:48

## 2019-03-02 RX ADMIN — LEVOTHYROXINE SODIUM SCH MCG: 75 TABLET ORAL at 06:19

## 2019-03-02 RX ADMIN — PIPERACILLIN SODIUM,TAZOBACTAM SODIUM SCH MLS/HR: 3; .375 INJECTION, POWDER, FOR SOLUTION INTRAVENOUS at 09:48

## 2019-03-02 RX ADMIN — ATORVASTATIN CALCIUM SCH MG: 40 TABLET, FILM COATED ORAL at 20:57

## 2019-03-02 RX ADMIN — METOPROLOL TARTRATE SCH MG: 25 TABLET, FILM COATED ORAL at 09:48

## 2019-03-02 RX ADMIN — HEPARIN SODIUM SCH UNIT: 5000 INJECTION, SOLUTION INTRAVENOUS; SUBCUTANEOUS at 20:56

## 2019-03-02 RX ADMIN — PIPERACILLIN SODIUM,TAZOBACTAM SODIUM SCH MLS/HR: 3; .375 INJECTION, POWDER, FOR SOLUTION INTRAVENOUS at 01:29

## 2019-03-02 RX ADMIN — CLOPIDOGREL BISULFATE SCH MG: 75 TABLET, FILM COATED ORAL at 09:48

## 2019-03-02 RX ADMIN — PIPERACILLIN SODIUM,TAZOBACTAM SODIUM SCH MLS/HR: 3; .375 INJECTION, POWDER, FOR SOLUTION INTRAVENOUS at 17:39

## 2019-03-02 NOTE — PN
Progress Note, Physician


History of Present Illness: 





comfortable





- Current Medication List


Current Medications: 


Active Medications





Acetaminophen (Tylenol -)  650 mg PO Q6H PRN


   PRN Reason: FEVER


Aspirin (Asa -)  81 mg PO DAILY [facility]


   Last Admin: 03/02/19 09:48 Dose:  81 mg


Atorvastatin Calcium (Lipitor -)  40 mg PO HS [facility]


   Last Admin: 03/01/19 22:19 Dose:  40 mg


Clopidogrel Bisulfate (Plavix -)  75 mg PO DAILY [facility]


   Last Admin: 03/02/19 09:48 Dose:  75 mg


Heparin Sodium (Porcine) (Heparin -)  5,000 unit SQ BID [facility]


   Last Admin: 03/02/19 09:48 Dose:  5,000 unit


Lactated Ringer's (Lactated Ringers Solution)  1,000 mls @ 125 mls/hr IV ASDIR 

[facility]


   Last Admin: 03/01/19 02:13 Dose:  125 mls/hr


Piperacillin Sod/Tazobactam (Sod 3.375 gm/ Dextrose)  50 mls @ 100 mls/hr IVPB 

Q8H-IV [facility]; Protocol


   Last Admin: 03/02/19 09:48 Dose:  100 mls/hr


Levothyroxine Sodium (Synthroid -)  150 mcg PO AM [facility]


   Last Admin: 03/02/19 06:19 Dose:  150 mcg


Metformin HCl (Glucophage Xr -)  750 mg PO TIDAC [facility]


   Last Admin: 03/02/19 11:25 Dose:  750 mg


Metoprolol Tartrate (Lopressor -)  25 mg PO DAILY [facility]


   Last Admin: 03/02/19 09:48 Dose:  25 mg


Ondansetron HCl (Zofran Injection)  4 mg IVPUSH Q6H PRN


   PRN Reason: NAUSEA AND/OR VOMITING


Oxycodone HCl (Roxicodone -)  10 mg PO Q6H PRN


   PRN Reason: PAIN











- Objective


Vital Signs: 


 Vital Signs











Temperature  97.6 F   03/02/19 15:27


 


Pulse Rate  65   03/02/19 15:27


 


Respiratory Rate  18   03/02/19 15:27


 


Blood Pressure  99/57 L  03/02/19 15:27


 


O2 Sat by Pulse Oximetry (%)  97   03/02/19 09:00











Constitutional: Yes: No Distress


HENT: Yes: Atraumatic


Neck: Yes: Supple


Cardiovascular: Yes: Regular Rate and Rhythm


Respiratory: Yes: CTA Bilaterally


Gastrointestinal: Yes: Normal Bowel Sounds


Extremities: Yes: Other (llex wound vac in place)


Neurological: Yes: Alert, Oriented


Labs: 


 CBC, BMP





 02/28/19 06:26 





 02/28/19 06:26 











Problem List





- Problems


(1) HTN (hypertension)


Assessment/Plan: 


on meds


monitor


Code(s): I10 - ESSENTIAL (PRIMARY) HYPERTENSION   





(2) HLD (hyperlipidemia)


Assessment/Plan: 


on meds


Code(s): E78.5 - HYPERLIPIDEMIA, UNSPECIFIED   





(3) Diabetes


Assessment/Plan: 


on po meds


monitor bgms


Code(s): E11.9 - TYPE 2 DIABETES MELLITUS WITHOUT COMPLICATIONS   





(4) Cellulitis, leg


Code(s): L03.119 - CELLULITIS OF UNSPECIFIED PART OF LIMB   





(5) Infected wound


Assessment/Plan: 


s/p debridement


wound care


dressing change


wound vac in place


Code(s): T14.8XXA - OTHER INJURY OF UNSPECIFIED BODY REGION, INITIAL ENCOUNTER; 

L08.9 - LOCAL INFECTION OF THE SKIN AND SUBCUTANEOUS TISSUE, UNSP

## 2019-03-02 NOTE — PN
Progress Note, Physician


History of Present Illness: 





Pt is doing well, afebrile, without specific complaints. 





- Current Medication List


Current Medications: 


Active Medications





Acetaminophen (Tylenol -)  650 mg PO Q6H PRN


   PRN Reason: FEVER


Aspirin (Asa -)  81 mg PO DAILY [facility]


   Last Admin: 03/02/19 09:48 Dose:  81 mg


Atorvastatin Calcium (Lipitor -)  40 mg PO HS [facility]


   Last Admin: 03/01/19 22:19 Dose:  40 mg


Clopidogrel Bisulfate (Plavix -)  75 mg PO DAILY [facility]


   Last Admin: 03/02/19 09:48 Dose:  75 mg


Heparin Sodium (Porcine) (Heparin -)  5,000 unit SQ BID [facility]


   Last Admin: 03/02/19 09:48 Dose:  5,000 unit


Lactated Ringer's (Lactated Ringers Solution)  1,000 mls @ 125 mls/hr IV ASDIR 

[facility]


   Last Admin: 03/01/19 02:13 Dose:  125 mls/hr


Piperacillin Sod/Tazobactam (Sod 3.375 gm/ Dextrose)  50 mls @ 100 mls/hr IVPB 

Q8H-IV [facility]; Protocol


   Last Admin: 03/02/19 09:48 Dose:  100 mls/hr


Levothyroxine Sodium (Synthroid -)  150 mcg PO AM [facility]


   Last Admin: 03/02/19 06:19 Dose:  150 mcg


Metformin HCl (Glucophage Xr -)  750 mg PO TIDAC [facility]


   Last Admin: 03/02/19 11:25 Dose:  750 mg


Metoprolol Tartrate (Lopressor -)  25 mg PO DAILY [facility]


   Last Admin: 03/02/19 09:48 Dose:  25 mg


Ondansetron HCl (Zofran Injection)  4 mg IVPUSH Q6H PRN


   PRN Reason: NAUSEA AND/OR VOMITING


Oxycodone HCl (Roxicodone -)  10 mg PO Q6H PRN


   PRN Reason: PAIN











- Objective


Vital Signs: 


 Vital Signs











Temperature  97.6 F   03/02/19 15:27


 


Pulse Rate  65   03/02/19 15:27


 


Respiratory Rate  18   03/02/19 15:27


 


Blood Pressure  99/57 L  03/02/19 15:27


 


O2 Sat by Pulse Oximetry (%)  97   03/02/19 09:00











Constitutional: Yes: No Distress


Cardiovascular: Yes: Regular Rate and Rhythm


Respiratory: Yes: Regular


Gastrointestinal: Yes: Normal Bowel Sounds, Soft


Wound/Incision: Yes: Other (LLE wound vac)


Neurological: Yes: Alert


Labs: 


 CBC, BMP





 02/28/19 06:26 





 02/28/19 06:26 





 Microbiology





02/27/19 15:20   Wound   Gram Stain - Final


02/27/19 15:20   Wound   Wound Culture - Final


                            Streptococcus Viridans











Problem List





- Problems


(1) Diabetes


Code(s): E11.9 - TYPE 2 DIABETES MELLITUS WITHOUT COMPLICATIONS   





(2) HLD (hyperlipidemia)


Code(s): E78.5 - HYPERLIPIDEMIA, UNSPECIFIED   





(3) HTN (hypertension)


Code(s): I10 - ESSENTIAL (PRIMARY) HYPERTENSION   





(4) Cellulitis, leg


Code(s): L03.119 - CELLULITIS OF UNSPECIFIED PART OF LIMB   





(5) Infected wound


Code(s): T14.8XXA - OTHER INJURY OF UNSPECIFIED BODY REGION, INITIAL ENCOUNTER; 

L08.9 - LOCAL INFECTION OF THE SKIN AND SUBCUTANEOUS TISSUE, UNSP   





Assessment/Plan





LLE ulcer s/p LLE debridement / wound vac


Leg cellulitis


-- continue antibiotics for now


-- plan is for grafting

## 2019-03-03 RX ADMIN — CLOPIDOGREL BISULFATE SCH MG: 75 TABLET, FILM COATED ORAL at 10:00

## 2019-03-03 RX ADMIN — ATORVASTATIN CALCIUM SCH MG: 40 TABLET, FILM COATED ORAL at 20:57

## 2019-03-03 RX ADMIN — LEVOTHYROXINE SODIUM SCH MCG: 75 TABLET ORAL at 06:18

## 2019-03-03 RX ADMIN — HEPARIN SODIUM SCH UNIT: 5000 INJECTION, SOLUTION INTRAVENOUS; SUBCUTANEOUS at 20:57

## 2019-03-03 RX ADMIN — METOPROLOL TARTRATE SCH MG: 25 TABLET, FILM COATED ORAL at 10:00

## 2019-03-03 RX ADMIN — ATORVASTATIN CALCIUM SCH: 40 TABLET, FILM COATED ORAL at 21:05

## 2019-03-03 RX ADMIN — ASPIRIN 81 MG SCH MG: 81 TABLET ORAL at 10:00

## 2019-03-03 RX ADMIN — PIPERACILLIN SODIUM,TAZOBACTAM SODIUM SCH MLS/HR: 3; .375 INJECTION, POWDER, FOR SOLUTION INTRAVENOUS at 18:09

## 2019-03-03 RX ADMIN — HEPARIN SODIUM SCH: 5000 INJECTION, SOLUTION INTRAVENOUS; SUBCUTANEOUS at 21:05

## 2019-03-03 RX ADMIN — HEPARIN SODIUM SCH UNIT: 5000 INJECTION, SOLUTION INTRAVENOUS; SUBCUTANEOUS at 10:00

## 2019-03-03 RX ADMIN — PIPERACILLIN SODIUM,TAZOBACTAM SODIUM SCH MLS/HR: 3; .375 INJECTION, POWDER, FOR SOLUTION INTRAVENOUS at 01:01

## 2019-03-03 RX ADMIN — PIPERACILLIN SODIUM,TAZOBACTAM SODIUM SCH MLS/HR: 3; .375 INJECTION, POWDER, FOR SOLUTION INTRAVENOUS at 10:00

## 2019-03-03 NOTE — PN
Progress Note, Physician


History of Present Illness: 





comfortable





- Current Medication List


Current Medications: 


Active Medications





Acetaminophen (Tylenol -)  650 mg PO Q6H PRN


   PRN Reason: FEVER


Aspirin (Asa -)  81 mg PO DAILY [facility]


   Last Admin: 03/03/19 10:00 Dose:  81 mg


Atorvastatin Calcium (Lipitor -)  40 mg PO HS [facility]


   Last Admin: 03/02/19 20:59 Dose:  Not Given


Clopidogrel Bisulfate (Plavix -)  75 mg PO DAILY [facility]


   Last Admin: 03/03/19 10:00 Dose:  75 mg


Heparin Sodium (Porcine) (Heparin -)  5,000 unit SQ BID [facility]


   Last Admin: 03/03/19 10:00 Dose:  5,000 unit


Piperacillin Sod/Tazobactam (Sod 3.375 gm/ Dextrose)  50 mls @ 100 mls/hr IVPB 

Q8H-IV [facility]; Protocol


   Last Admin: 03/03/19 10:00 Dose:  100 mls/hr


Levothyroxine Sodium (Synthroid -)  150 mcg PO AM [facility]


   Last Admin: 03/03/19 06:18 Dose:  150 mcg


Metformin HCl (Glucophage Xr -)  750 mg PO TIDAC [facility]


   Last Admin: 03/03/19 12:24 Dose:  750 mg


Metoprolol Tartrate (Lopressor -)  25 mg PO DAILY [facility]


   Last Admin: 03/03/19 10:00 Dose:  25 mg


Ondansetron HCl (Zofran Injection)  4 mg IVPUSH Q6H PRN


   PRN Reason: NAUSEA AND/OR VOMITING











- Objective


Vital Signs: 


 Vital Signs











Temperature  98.3 F   03/03/19 10:59


 


Pulse Rate  69   03/03/19 10:59


 


Respiratory Rate  18   03/03/19 10:59


 


Blood Pressure  108/60   03/03/19 10:59


 


O2 Sat by Pulse Oximetry (%)  96   03/03/19 09:00











Constitutional: Yes: No Distress


HENT: Yes: Atraumatic


Neck: Yes: Supple


Cardiovascular: Yes: Regular Rate and Rhythm


Respiratory: Yes: CTA Bilaterally


Gastrointestinal: Yes: Normal Bowel Sounds


Extremities: Yes: Other (llex wound vac in place)


Neurological: Yes: Alert, Oriented


Labs: 


 CBC, BMP





 03/02/19 16:40 





 03/02/19 16:40 











Problem List





- Problems


(1) HTN (hypertension)


Code(s): I10 - ESSENTIAL (PRIMARY) HYPERTENSION   





(2) HLD (hyperlipidemia)


Code(s): E78.5 - HYPERLIPIDEMIA, UNSPECIFIED   





(3) Diabetes


Code(s): E11.9 - TYPE 2 DIABETES MELLITUS WITHOUT COMPLICATIONS   





(4) Cellulitis, leg


Code(s): L03.119 - CELLULITIS OF UNSPECIFIED PART OF LIMB   





(5) Infected wound


Code(s): T14.8XXA - OTHER INJURY OF UNSPECIFIED BODY REGION, INITIAL ENCOUNTER; 

L08.9 - LOCAL INFECTION OF THE SKIN AND SUBCUTANEOUS TISSUE, UNSP

## 2019-03-03 NOTE — PN
Progress Note, Physician


History of Present Illness: 





No new events. Pt without specific complaints. 





- Current Medication List


Current Medications: 


Active Medications





Acetaminophen (Tylenol -)  650 mg PO Q6H PRN


   PRN Reason: FEVER


Aspirin (Asa -)  81 mg PO DAILY [facility]


   Last Admin: 03/03/19 10:00 Dose:  81 mg


Atorvastatin Calcium (Lipitor -)  40 mg PO HS [facility]


   Last Admin: 03/02/19 20:59 Dose:  Not Given


Clopidogrel Bisulfate (Plavix -)  75 mg PO DAILY [facility]


   Last Admin: 03/03/19 10:00 Dose:  75 mg


Heparin Sodium (Porcine) (Heparin -)  5,000 unit SQ BID [facility]


   Last Admin: 03/03/19 10:00 Dose:  5,000 unit


Piperacillin Sod/Tazobactam (Sod 3.375 gm/ Dextrose)  50 mls @ 100 mls/hr IVPB 

Q8H-IV [facility]; Protocol


   Last Admin: 03/03/19 10:00 Dose:  100 mls/hr


Levothyroxine Sodium (Synthroid -)  150 mcg PO AM [facility]


   Last Admin: 03/03/19 06:18 Dose:  150 mcg


Metformin HCl (Glucophage Xr -)  750 mg PO TIDAC [facility]


   Last Admin: 03/03/19 12:24 Dose:  750 mg


Metoprolol Tartrate (Lopressor -)  25 mg PO DAILY [facility]


   Last Admin: 03/03/19 10:00 Dose:  25 mg


Ondansetron HCl (Zofran Injection)  4 mg IVPUSH Q6H PRN


   PRN Reason: NAUSEA AND/OR VOMITING











- Objective


Vital Signs: 


 Vital Signs











Temperature  98.3 F   03/03/19 10:59


 


Pulse Rate  69   03/03/19 10:59


 


Respiratory Rate  18   03/03/19 10:59


 


Blood Pressure  108/60   03/03/19 10:59


 


O2 Sat by Pulse Oximetry (%)  97   03/02/19 19:49











Constitutional: Yes: No Distress, Calm


Cardiovascular: Yes: Regular Rate and Rhythm


Respiratory: Yes: Regular


Gastrointestinal: Yes: Normal Bowel Sounds, Soft


Wound/Incision: Yes: Other (LLE wound vac)


Neurological: Yes: Alert


Labs: 


 CBC, BMP





 03/02/19 16:40 





 03/02/19 16:40 





 Microbiology





02/27/19 15:20   Wound   Gram Stain - Final


02/27/19 15:20   Wound   Wound Culture - Final


                            Streptococcus Viridans











Problem List





- Problems


(1) Diabetes


Code(s): E11.9 - TYPE 2 DIABETES MELLITUS WITHOUT COMPLICATIONS   





(2) HLD (hyperlipidemia)


Code(s): E78.5 - HYPERLIPIDEMIA, UNSPECIFIED   





(3) HTN (hypertension)


Code(s): I10 - ESSENTIAL (PRIMARY) HYPERTENSION   





(4) Cellulitis, leg


Code(s): L03.119 - CELLULITIS OF UNSPECIFIED PART OF LIMB   





(5) Infected wound


Code(s): T14.8XXA - OTHER INJURY OF UNSPECIFIED BODY REGION, INITIAL ENCOUNTER; 

L08.9 - LOCAL INFECTION OF THE SKIN AND SUBCUTANEOUS TISSUE, UNSP   





Assessment/Plan





LLE ulcer s/p LLE debridement / wound vac


Leg cellulitis


-- continue antibiotics at this time


-- graft placement tentatively scheduled

## 2019-03-04 LAB
ALBUMIN SERPL-MCNC: 2.9 G/DL (ref 3.4–5)
ALP SERPL-CCNC: 74 U/L (ref 45–117)
ALT SERPL-CCNC: 15 U/L (ref 13–61)
ANION GAP SERPL CALC-SCNC: 8 MMOL/L (ref 8–16)
AST SERPL-CCNC: 14 U/L (ref 15–37)
BASOPHILS # BLD: 1.1 % (ref 0–2)
BILIRUB SERPL-MCNC: 0.5 MG/DL (ref 0.2–1)
BUN SERPL-MCNC: 19 MG/DL (ref 7–18)
CALCIUM SERPL-MCNC: 8.5 MG/DL (ref 8.5–10.1)
CHLORIDE SERPL-SCNC: 102 MMOL/L (ref 98–107)
CO2 SERPL-SCNC: 27 MMOL/L (ref 21–32)
CREAT SERPL-MCNC: 0.8 MG/DL (ref 0.55–1.3)
DEPRECATED RDW RBC AUTO: 16 % (ref 11.9–15.9)
EOSINOPHIL # BLD: 8.3 % (ref 0–4.5)
GLUCOSE SERPL-MCNC: 102 MG/DL (ref 74–106)
HCT VFR BLD CALC: 34.8 % (ref 35.4–49)
HGB BLD-MCNC: 12 GM/DL (ref 11.7–16.9)
LYMPHOCYTES # BLD: 22.9 % (ref 8–40)
MCH RBC QN AUTO: 30.5 PG (ref 25.7–33.7)
MCHC RBC AUTO-ENTMCNC: 34.6 G/DL (ref 32–35.9)
MCV RBC: 88.2 FL (ref 80–96)
MONOCYTES # BLD AUTO: 9.2 % (ref 3.8–10.2)
NEUTROPHILS # BLD: 58.5 % (ref 42.8–82.8)
PLATELET # BLD AUTO: 157 K/MM3 (ref 134–434)
PMV BLD: 8.2 FL (ref 7.5–11.1)
POTASSIUM SERPLBLD-SCNC: 3.9 MMOL/L (ref 3.5–5.1)
PROT SERPL-MCNC: 6.2 G/DL (ref 6.4–8.2)
RBC # BLD AUTO: 3.94 M/MM3 (ref 4–5.6)
SODIUM SERPL-SCNC: 137 MMOL/L (ref 136–145)
WBC # BLD AUTO: 5.8 K/MM3 (ref 4–10)

## 2019-03-04 RX ADMIN — HEPARIN SODIUM SCH UNIT: 5000 INJECTION, SOLUTION INTRAVENOUS; SUBCUTANEOUS at 21:22

## 2019-03-04 RX ADMIN — PIPERACILLIN SODIUM,TAZOBACTAM SODIUM SCH MLS/HR: 3; .375 INJECTION, POWDER, FOR SOLUTION INTRAVENOUS at 17:43

## 2019-03-04 RX ADMIN — ASPIRIN 81 MG SCH MG: 81 TABLET ORAL at 10:03

## 2019-03-04 RX ADMIN — LEVOTHYROXINE SODIUM SCH MCG: 75 TABLET ORAL at 06:12

## 2019-03-04 RX ADMIN — CLOPIDOGREL BISULFATE SCH MG: 75 TABLET, FILM COATED ORAL at 10:03

## 2019-03-04 RX ADMIN — ATORVASTATIN CALCIUM SCH MG: 40 TABLET, FILM COATED ORAL at 21:22

## 2019-03-04 RX ADMIN — HEPARIN SODIUM SCH UNIT: 5000 INJECTION, SOLUTION INTRAVENOUS; SUBCUTANEOUS at 10:03

## 2019-03-04 RX ADMIN — PIPERACILLIN SODIUM,TAZOBACTAM SODIUM SCH MLS/HR: 3; .375 INJECTION, POWDER, FOR SOLUTION INTRAVENOUS at 10:00

## 2019-03-04 RX ADMIN — PIPERACILLIN SODIUM,TAZOBACTAM SODIUM SCH MLS/HR: 3; .375 INJECTION, POWDER, FOR SOLUTION INTRAVENOUS at 00:59

## 2019-03-04 RX ADMIN — METOPROLOL TARTRATE SCH MG: 25 TABLET, FILM COATED ORAL at 10:03

## 2019-03-04 NOTE — PN
Progress Note, Physician





- Current Medication List


Current Medications: 


Active Medications





Acetaminophen (Tylenol -)  650 mg PO Q6H PRN


   PRN Reason: FEVER


Aspirin (Asa -)  81 mg PO DAILY [facility]


   Last Admin: 03/04/19 10:03 Dose:  81 mg


Atorvastatin Calcium (Lipitor -)  40 mg PO HS [facility]


   Last Admin: 03/03/19 21:05 Dose:  Not Given


Clopidogrel Bisulfate (Plavix -)  75 mg PO DAILY [facility]


   Last Admin: 03/04/19 10:03 Dose:  75 mg


Heparin Sodium (Porcine) (Heparin -)  5,000 unit SQ BID [facility]


   Last Admin: 03/04/19 10:03 Dose:  5,000 unit


Piperacillin Sod/Tazobactam (Sod 3.375 gm/ Dextrose)  50 mls @ 100 mls/hr IVPB 

Q8H-IV [facility]; Protocol


   Last Admin: 03/04/19 10:00 Dose:  100 mls/hr


Levothyroxine Sodium (Synthroid -)  150 mcg PO AM [facility]


   Last Admin: 03/04/19 06:12 Dose:  150 mcg


Metformin HCl (Glucophage Xr -)  750 mg PO TIDAC [facility]


   Last Admin: 03/04/19 12:16 Dose:  750 mg


Metoprolol Tartrate (Lopressor -)  25 mg PO DAILY [facility]


   Last Admin: 03/04/19 10:03 Dose:  25 mg


Ondansetron HCl (Zofran Injection)  4 mg IVPUSH Q6H PRN


   PRN Reason: NAUSEA AND/OR VOMITING











- Objective


Vital Signs: 


 Vital Signs











Temperature  98 F   03/04/19 06:36


 


Pulse Rate  76   03/04/19 06:36


 


Respiratory Rate  20   03/04/19 06:36


 


Blood Pressure  140/75   03/04/19 06:36


 


O2 Sat by Pulse Oximetry (%)  96   03/03/19 21:00











Constitutional: Yes: No Distress


HENT: Yes: Atraumatic


Neck: Yes: Supple


Cardiovascular: Yes: Regular Rate and Rhythm


Respiratory: Yes: CTA Bilaterally


Gastrointestinal: Yes: Normal Bowel Sounds


Extremities: Yes: Other (llex in dressing wound vac off)


Neurological: Yes: Alert, Oriented


Labs: 


 CBC, BMP





 03/04/19 06:00 





 03/04/19 06:00 











Problem List





- Problems


(1) HTN (hypertension)


Assessment/Plan: 


on meds


monitor


Code(s): I10 - ESSENTIAL (PRIMARY) HYPERTENSION   





(2) HLD (hyperlipidemia)


Assessment/Plan: 


on meds


Code(s): E78.5 - HYPERLIPIDEMIA, UNSPECIFIED   





(3) Diabetes


Assessment/Plan: 


on po meds


monitor bgms


Code(s): E11.9 - TYPE 2 DIABETES MELLITUS WITHOUT COMPLICATIONS   





(4) Cellulitis, leg


Assessment/Plan: 


on iv abx


wound vac 





Code(s): L03.119 - CELLULITIS OF UNSPECIFIED PART OF LIMB   





(5) Infected wound


Assessment/Plan: 


s/p debridement


wound care


dressing change


wound vac in place


Code(s): T14.8XXA - OTHER INJURY OF UNSPECIFIED BODY REGION, INITIAL ENCOUNTER; 

L08.9 - LOCAL INFECTION OF THE SKIN AND SUBCUTANEOUS TISSUE, UNSP

## 2019-03-04 NOTE — PN
Progress Note, Physician


History of Present Illness: 





patient doing well


no complaints


wound vac in place





- Current Medication List


Current Medications: 


Active Medications





Acetaminophen (Tylenol -)  650 mg PO Q6H PRN


   PRN Reason: FEVER


Aspirin (Asa -)  81 mg PO DAILY [facility]


   Last Admin: 03/04/19 10:03 Dose:  81 mg


Atorvastatin Calcium (Lipitor -)  40 mg PO HS [facility]


   Last Admin: 03/03/19 21:05 Dose:  Not Given


Clopidogrel Bisulfate (Plavix -)  75 mg PO DAILY [facility]


   Last Admin: 03/04/19 10:03 Dose:  75 mg


Heparin Sodium (Porcine) (Heparin -)  5,000 unit SQ BID [facility]


   Last Admin: 03/04/19 10:03 Dose:  5,000 unit


Piperacillin Sod/Tazobactam (Sod 3.375 gm/ Dextrose)  50 mls @ 100 mls/hr IVPB 

Q8H-IV [facility]; Protocol


   Last Admin: 03/04/19 10:00 Dose:  100 mls/hr


Levothyroxine Sodium (Synthroid -)  150 mcg PO AM [facility]


   Last Admin: 03/04/19 06:12 Dose:  150 mcg


Metformin HCl (Glucophage Xr -)  750 mg PO TIDAC [facility]


   Last Admin: 03/04/19 12:16 Dose:  750 mg


Metoprolol Tartrate (Lopressor -)  25 mg PO DAILY [facility]


   Last Admin: 03/04/19 10:03 Dose:  25 mg


Ondansetron HCl (Zofran Injection)  4 mg IVPUSH Q6H PRN


   PRN Reason: NAUSEA AND/OR VOMITING











- Objective


Vital Signs: 


 Vital Signs











Temperature  98 F   03/04/19 06:36


 


Pulse Rate  76   03/04/19 06:36


 


Respiratory Rate  20   03/04/19 06:36


 


Blood Pressure  140/75   03/04/19 06:36


 


O2 Sat by Pulse Oximetry (%)  96   03/03/19 21:00











Constitutional: Yes: No Distress, Calm


Cardiovascular: Yes: Regular Rate and Rhythm


Respiratory: Yes: Regular, CTA Bilaterally


Gastrointestinal: Yes: Normal Bowel Sounds, Soft


Musculoskeletal: Yes: WNL


Extremities: Yes: Other


Wound/Incision: Yes: Other (wound vac)


Neurological: Yes: Alert, Oriented


Psychiatric: Yes: Alert, Oriented


Labs: 


 CBC, BMP





 03/04/19 06:00 





 03/04/19 06:00 











Assessment/Plan








 Problem List 





- Problems


(1) HTN (hypertension)


Code(s): I10 - ESSENTIAL (PRIMARY) HYPERTENSION   





(2) HLD (hyperlipidemia)


Code(s): E78.5 - HYPERLIPIDEMIA, UNSPECIFIED   





(3) Diabetes


Code(s): E11.9 - TYPE 2 DIABETES MELLITUS WITHOUT COMPLICATIONS   





(4) Cellulitis, leg


Code(s): L03.119 - CELLULITIS OF UNSPECIFIED PART OF LIMB   





(5) Infected wound


Code(s): T14.8XXA - OTHER INJURY OF UNSPECIFIED BODY REGION, INITIAL ENCOUNTER; 

L08.9 - LOCAL INFECTION OF THE SKIN AND SUBCUTANEOUS TISSUE, UNSP   





plan


continue abx


plan for graft tomorrow


rest as per the team


patient doing well

## 2019-03-05 RX ADMIN — CLOPIDOGREL BISULFATE SCH: 75 TABLET, FILM COATED ORAL at 10:39

## 2019-03-05 RX ADMIN — HEPARIN SODIUM SCH UNIT: 5000 INJECTION, SOLUTION INTRAVENOUS; SUBCUTANEOUS at 22:24

## 2019-03-05 RX ADMIN — METOPROLOL TARTRATE SCH MG: 25 TABLET, FILM COATED ORAL at 10:29

## 2019-03-05 RX ADMIN — SILVER SULFADIAZINE SCH APPLIC: 10 CREAM TOPICAL at 23:19

## 2019-03-05 RX ADMIN — PIPERACILLIN SODIUM,TAZOBACTAM SODIUM SCH MLS/HR: 3; .375 INJECTION, POWDER, FOR SOLUTION INTRAVENOUS at 17:31

## 2019-03-05 RX ADMIN — PIPERACILLIN SODIUM,TAZOBACTAM SODIUM SCH MLS/HR: 3; .375 INJECTION, POWDER, FOR SOLUTION INTRAVENOUS at 10:28

## 2019-03-05 RX ADMIN — ATORVASTATIN CALCIUM SCH MG: 40 TABLET, FILM COATED ORAL at 23:19

## 2019-03-05 RX ADMIN — HEPARIN SODIUM SCH: 5000 INJECTION, SOLUTION INTRAVENOUS; SUBCUTANEOUS at 10:38

## 2019-03-05 RX ADMIN — SILVER SULFADIAZINE SCH APPLIC: 10 CREAM TOPICAL at 15:26

## 2019-03-05 RX ADMIN — ASPIRIN 81 MG SCH: 81 TABLET ORAL at 10:16

## 2019-03-05 RX ADMIN — PIPERACILLIN SODIUM,TAZOBACTAM SODIUM SCH MLS/HR: 3; .375 INJECTION, POWDER, FOR SOLUTION INTRAVENOUS at 01:43

## 2019-03-05 RX ADMIN — LEVOTHYROXINE SODIUM SCH MCG: 75 TABLET ORAL at 07:12

## 2019-03-05 NOTE — PN
Progress Note, Physician





- Current Medication List


Current Medications: 


Active Medications





Acetaminophen (Tylenol -)  650 mg PO Q6H PRN


   PRN Reason: FEVER


Aspirin (Asa -)  81 mg PO DAILY [facility]


   Last Admin: 03/05/19 10:16 Dose:  Not Given


Atorvastatin Calcium (Lipitor -)  40 mg PO HS [facility]


   Last Admin: 03/04/19 21:22 Dose:  40 mg


Clopidogrel Bisulfate (Plavix -)  75 mg PO DAILY [facility]


   Last Admin: 03/05/19 10:39 Dose:  Not Given


Heparin Sodium (Porcine) (Heparin -)  5,000 unit SQ BID [facility]


   Last Admin: 03/05/19 10:38 Dose:  Not Given


Piperacillin Sod/Tazobactam (Sod 3.375 gm/ Dextrose)  50 mls @ 100 mls/hr IVPB 

Q8H-IV [facility]; Protocol


   Last Admin: 03/05/19 10:28 Dose:  100 mls/hr


Levothyroxine Sodium (Synthroid -)  150 mcg PO AM [facility]


   Last Admin: 03/05/19 07:12 Dose:  150 mcg


Metformin HCl (Glucophage Xr -)  750 mg PO TIDAC [facility]


   Last Admin: 03/05/19 10:39 Dose:  Not Given


Metoprolol Tartrate (Lopressor -)  25 mg PO DAILY [facility]


   Last Admin: 03/05/19 10:29 Dose:  25 mg


Ondansetron HCl (Zofran Injection)  4 mg IVPUSH Q6H PRN


   PRN Reason: NAUSEA AND/OR VOMITING


Silver Sulfadiazine (Silvadene -)  1 applic TP BID [facility]


   Last Admin: 03/05/19 15:26 Dose:  1 applic











- Objective


Vital Signs: 


 Vital Signs











Temperature  98.3 F   03/05/19 14:12


 


Pulse Rate  66   03/05/19 14:12


 


Respiratory Rate  18   03/05/19 14:12


 


Blood Pressure  95/52 L  03/05/19 14:12


 


O2 Sat by Pulse Oximetry (%)  95   03/05/19 10:00











Constitutional: Yes: No Distress


HENT: Yes: Atraumatic


Neck: Yes: Supple


Cardiovascular: Yes: Regular Rate and Rhythm


Respiratory: Yes: CTA Bilaterally


Gastrointestinal: Yes: Normal Bowel Sounds


Neurological: Yes: Alert, Oriented


Labs: 


 CBC, BMP





 03/04/19 06:00 





 03/04/19 06:00 











Problem List





- Problems


(1) HTN (hypertension)


Assessment/Plan: 


on meds


monitor


Code(s): I10 - ESSENTIAL (PRIMARY) HYPERTENSION   





(2) HLD (hyperlipidemia)


Assessment/Plan: 


on meds


Code(s): E78.5 - HYPERLIPIDEMIA, UNSPECIFIED   





(3) Diabetes


Assessment/Plan: 


on po meds


monitor bgms


Code(s): E11.9 - TYPE 2 DIABETES MELLITUS WITHOUT COMPLICATIONS   





(4) Cellulitis, leg


Assessment/Plan: 


on iv abx


wound vac 





Code(s): L03.119 - CELLULITIS OF UNSPECIFIED PART OF LIMB   





(5) Infected wound


Assessment/Plan: 


iv abx


for graft


Code(s): T14.8XXA - OTHER INJURY OF UNSPECIFIED BODY REGION, INITIAL ENCOUNTER; 

L08.9 - LOCAL INFECTION OF THE SKIN AND SUBCUTANEOUS TISSUE, UNSP

## 2019-03-05 NOTE — PN
Progress Note (short form)





- Note


Progress Note: 





85 year old Diabetic male with a large Open wound left lower leg, Post Surgery 

needs reconstruction/closure of defect


Wound evaluated, good granulation tissue, minimal discharge, no odor, no warm , 

no induration





Plan :


1. Closure of large defect with split skin graft .


2. Portion of defect closure with Advancement flap, rest of the defect near the 

ankle will require grafting





VAC Application post graft, will require splinting





Procedure explained, all questions answered


Will apply VAC Post grafting


Labs noted including Hb Hct


Microbiology





02/27/19 15:20   Wound   Gram Stain - Final


02/27/19 15:20   Wound   Wound Culture - Final


                              Streptococcus Viridans





 Laboratory Tests











  02/28/19 03/02/19 03/04/19





  06:26 16:40 06:00


 


Hgb  10.9 L   12.0


 


Hct  31.6 L   34.8 L


 


RDW   16.2 H  16.0 H


 


Neutrophils %  86.5 H D   58.5


 


Lymphocytes %  6.8 L D   22.9  D


 


Monocytes %   12.8 H D 


 


Eosinophils %   5.8 H D

## 2019-03-05 NOTE — PN
Progress Note, Physician


History of Present Illness: 





stable


plan is to take him to or tomorrow for the graft





- Current Medication List


Current Medications: 


Active Medications





Acetaminophen (Tylenol -)  650 mg PO Q6H PRN


   PRN Reason: FEVER


Aspirin (Asa -)  81 mg PO DAILY [facility]


   Last Admin: 03/05/19 10:16 Dose:  Not Given


Atorvastatin Calcium (Lipitor -)  40 mg PO HS [facility]


   Last Admin: 03/04/19 21:22 Dose:  40 mg


Clopidogrel Bisulfate (Plavix -)  75 mg PO DAILY [facility]


   Last Admin: 03/05/19 10:39 Dose:  Not Given


Heparin Sodium (Porcine) (Heparin -)  5,000 unit SQ BID [facility]


   Last Admin: 03/05/19 10:38 Dose:  Not Given


Piperacillin Sod/Tazobactam (Sod 3.375 gm/ Dextrose)  50 mls @ 100 mls/hr IVPB 

Q8H-IV [facility]; Protocol


   Last Admin: 03/05/19 10:28 Dose:  100 mls/hr


Levothyroxine Sodium (Synthroid -)  150 mcg PO AM [facility]


   Last Admin: 03/05/19 07:12 Dose:  150 mcg


Metformin HCl (Glucophage Xr -)  750 mg PO TIDAC [facility]


   Last Admin: 03/05/19 10:39 Dose:  Not Given


Metoprolol Tartrate (Lopressor -)  25 mg PO DAILY [facility]


   Last Admin: 03/05/19 10:29 Dose:  25 mg


Ondansetron HCl (Zofran Injection)  4 mg IVPUSH Q6H PRN


   PRN Reason: NAUSEA AND/OR VOMITING


Silver Sulfadiazine (Silvadene -)  1 applic TP BID [facility]











- Objective


Vital Signs: 


 Vital Signs











Temperature  97.9 F   03/05/19 10:00


 


Pulse Rate  72   03/05/19 10:00


 


Respiratory Rate  24 H  03/05/19 10:00


 


Blood Pressure  157/57 L  03/05/19 10:00


 


O2 Sat by Pulse Oximetry (%)  95   03/05/19 10:00











Constitutional: Yes: No Distress, Calm


Cardiovascular: Yes: Regular Rate and Rhythm


Respiratory: Yes: Regular, CTA Bilaterally


Gastrointestinal: Yes: Normal Bowel Sounds, Soft


Musculoskeletal: Yes: WNL


Extremities: Yes: Other


Wound/Incision: Yes: Other (wound vac in place)


Neurological: Yes: Alert, Oriented


Psychiatric: Yes: Alert, Oriented


Labs: 


 CBC, BMP





 03/04/19 06:00 





 03/04/19 06:00 











Assessment/Plan








 Problem List 





- Problems


(1) HTN (hypertension)


Code(s): I10 - ESSENTIAL (PRIMARY) HYPERTENSION   





(2) HLD (hyperlipidemia)


Code(s): E78.5 - HYPERLIPIDEMIA, UNSPECIFIED   





(3) Diabetes


Code(s): E11.9 - TYPE 2 DIABETES MELLITUS WITHOUT COMPLICATIONS   





(4) Cellulitis, leg


Code(s): L03.119 - CELLULITIS OF UNSPECIFIED PART OF LIMB   





(5) Infected wound


Code(s): T14.8XXA - OTHER INJURY OF UNSPECIFIED BODY REGION, INITIAL ENCOUNTER; 

L08.9 - LOCAL INFECTION OF THE SKIN AND SUBCUTANEOUS TISSUE, UNSP   





plan


continue abx


plan for graft tomorrow


rest as per the team


patient doing well

## 2019-03-06 PROCEDURE — 0HRLX74 REPLACEMENT OF LEFT LOWER LEG SKIN WITH AUTOLOGOUS TISSUE SUBSTITUTE, PARTIAL THICKNESS, EXTERNAL APPROACH: ICD-10-PCS | Performed by: PLASTIC SURGERY

## 2019-03-06 PROCEDURE — 0JBP0ZZ EXCISION OF LEFT LOWER LEG SUBCUTANEOUS TISSUE AND FASCIA, OPEN APPROACH: ICD-10-PCS | Performed by: PLASTIC SURGERY

## 2019-03-06 RX ADMIN — HEPARIN SODIUM SCH: 5000 INJECTION, SOLUTION INTRAVENOUS; SUBCUTANEOUS at 10:29

## 2019-03-06 RX ADMIN — PIPERACILLIN SODIUM,TAZOBACTAM SODIUM SCH MLS/HR: 3; .375 INJECTION, POWDER, FOR SOLUTION INTRAVENOUS at 01:44

## 2019-03-06 RX ADMIN — ASPIRIN 81 MG SCH: 81 TABLET ORAL at 10:28

## 2019-03-06 RX ADMIN — SODIUM CHLORIDE, POTASSIUM CHLORIDE, SODIUM LACTATE AND CALCIUM CHLORIDE SCH: 600; 310; 30; 20 INJECTION, SOLUTION INTRAVENOUS at 21:33

## 2019-03-06 RX ADMIN — SILVER SULFADIAZINE SCH: 10 CREAM TOPICAL at 06:51

## 2019-03-06 RX ADMIN — PIPERACILLIN SODIUM,TAZOBACTAM SODIUM SCH MLS/HR: 3; .375 INJECTION, POWDER, FOR SOLUTION INTRAVENOUS at 10:29

## 2019-03-06 RX ADMIN — ATORVASTATIN CALCIUM SCH MG: 40 TABLET, FILM COATED ORAL at 21:32

## 2019-03-06 RX ADMIN — PIPERACILLIN SODIUM,TAZOBACTAM SODIUM SCH MLS/HR: 3; .375 INJECTION, POWDER, FOR SOLUTION INTRAVENOUS at 18:43

## 2019-03-06 RX ADMIN — CLOPIDOGREL BISULFATE SCH: 75 TABLET, FILM COATED ORAL at 10:29

## 2019-03-06 RX ADMIN — METOPROLOL TARTRATE SCH MG: 25 TABLET, FILM COATED ORAL at 10:29

## 2019-03-06 RX ADMIN — LEVOTHYROXINE SODIUM SCH: 75 TABLET ORAL at 06:42

## 2019-03-06 RX ADMIN — SILVER SULFADIAZINE SCH APPLIC: 10 CREAM TOPICAL at 10:38

## 2019-03-06 NOTE — PN
Progress Note, Physician


History of Present Illness: 





patient doing well


no complaints





- Current Medication List


Current Medications: 


Active Medications





Acetaminophen (Tylenol -)  650 mg PO Q6H PRN


   PRN Reason: FEVER


Aspirin (Asa -)  81 mg PO DAILY [facility]


   Last Admin: 03/06/19 10:28 Dose:  Not Given


Atorvastatin Calcium (Lipitor -)  40 mg PO HS [facility]


   Last Admin: 03/05/19 23:19 Dose:  40 mg


Clopidogrel Bisulfate (Plavix -)  75 mg PO DAILY [facility]


   Last Admin: 03/06/19 10:29 Dose:  Not Given


Heparin Sodium (Porcine) (Heparin -)  5,000 unit SQ BID [facility]


   Last Admin: 03/06/19 10:29 Dose:  Not Given


Piperacillin Sod/Tazobactam (Sod 3.375 gm/ Dextrose)  50 mls @ 100 mls/hr IVPB 

Q8H-IV [facility]; Protocol


   Last Admin: 03/06/19 10:29 Dose:  100 mls/hr


Levothyroxine Sodium (Synthroid -)  150 mcg PO AM [facility]


   Last Admin: 03/06/19 06:42 Dose:  Not Given


Metformin HCl (Glucophage Xr -)  750 mg PO TIDAC [facility]


   Last Admin: 03/06/19 10:31 Dose:  Not Given


Metoprolol Tartrate (Lopressor -)  25 mg PO DAILY [facility]


   Last Admin: 03/06/19 10:29 Dose:  25 mg


Ondansetron HCl (Zofran Injection)  4 mg IVPUSH Q6H PRN


   PRN Reason: NAUSEA AND/OR VOMITING


Silver Sulfadiazine (Silvadene -)  1 applic TP BID [facility]


   Last Admin: 03/06/19 10:38 Dose:  1 applic











- Objective


Vital Signs: 


 Vital Signs











Temperature  98.5 F   03/06/19 09:00


 


Pulse Rate  68   03/06/19 09:00


 


Respiratory Rate  20   03/06/19 09:00


 


Blood Pressure  123/69   03/06/19 09:00


 


O2 Sat by Pulse Oximetry (%)  97   03/06/19 09:00











Constitutional: Yes: No Distress


Cardiovascular: Yes: Regular Rate and Rhythm


Respiratory: Yes: Regular, CTA Bilaterally


Gastrointestinal: Yes: Normal Bowel Sounds, Soft


Musculoskeletal: Yes: WNL


Extremities: Yes: Other


Neurological: Yes: Alert, Oriented


Psychiatric: Yes: Alert, Oriented


Labs: 


 CBC, BMP





 03/04/19 06:00 





 03/04/19 06:00 











Assessment/Plan








 Problem List 





- Problems


(1) HTN (hypertension)


Code(s): I10 - ESSENTIAL (PRIMARY) HYPERTENSION   





(2) HLD (hyperlipidemia)


Code(s): E78.5 - HYPERLIPIDEMIA, UNSPECIFIED   





(3) Diabetes


Code(s): E11.9 - TYPE 2 DIABETES MELLITUS WITHOUT COMPLICATIONS   





(4) Cellulitis, leg


Code(s): L03.119 - CELLULITIS OF UNSPECIFIED PART OF LIMB   





(5) Infected wound


Code(s): T14.8XXA - OTHER INJURY OF UNSPECIFIED BODY REGION, INITIAL ENCOUNTER; 

L08.9 - LOCAL INFECTION OF THE SKIN AND SUBCUTANEOUS TISSUE, UNSP   





plan


continue abx


plan for graft tomorrow


rest as per the team


patient doing well

## 2019-03-06 NOTE — PN
Progress Note, Physician





- Current Medication List


Current Medications: 


Active Medications





Acetaminophen (Tylenol -)  650 mg PO Q6H PRN


   PRN Reason: FEVER


Aspirin (Asa -)  [facility]ILY [facility]


   Last Admin: 03/06/19 10:28 Dose:  Not Given


Atorvastatin Calcium (Lipitor -)  40 mg PO HS [facility]


   Last Admin: 03/05/19 23:19 Dose:  40 mg


Clopidogrel Bisulfate (Plavix -)  75 mg PO DAILY [facility]


   Last Admin: 03/06/19 10:29 Dose:  Not Given


Fentanyl (Sublimaze Injection -)  50 mcg IVPUSH Q5M PRN


   PRN Reason: PAIN-PACU ORDER X 4 DOSES ONLY


   Last Admin: 03/06/19 17:00 Dose:  50 mcg


Heparin Sodium (Porcine) (Heparin -)  5,000 unit SQ BID [facility]


   Last Admin: 03/06/19 10:29 Dose:  Not Given


Piperacillin Sod/Tazobactam (Sod 3.375 gm/ Dextrose)  50 mls @ 100 mls/hr IVPB 

[facility]; [facility]


   Last Admin: 03/06/19 10:29 Dose:  100 mls/hr


Lactated Ringer's (Lactated Ringers Solution)  1,000 mls @ 125 mls/hr IV [facility] 

[facility]


Levothyroxine Sodium (Synthroid -)  150 mcg PO AM [facility]


   Last Admin: 03/06/19 06:42 Dose:  Not Given


Metformin HCl (Glucophage Xr -)  750 mg PO TIDAC [facility]


   Last Admin: 03/06/19 10:31 Dose:  Not Given


Metoprolol Tartrate (Lopressor -)  25 mg PO DAILY [facility]


   Last Admin: 03/06/19 10:29 Dose:  25 mg


Ondansetron HCl (Zofran Injection)  4 mg IVPUSH Q6H PRN


   PRN Reason: NAUSEA AND/OR VOMITING


Ondansetron HCl (Zofran Injection)  4 mg IVPUSH Q6H PRN


   PRN Reason: NAUSEA AND/OR VOMITING


Silver Sulfadiazine (Silvadene -)  1 applic TP BID [facility]


   Last Admin: 03/06/19 10:38 Dose:  1 applic











- Objective


Vital Signs: 


 Vital Signs











Temperature  97.8 F   03/06/19 16:11


 


Pulse Rate  66   03/06/19 17:25


 


Respiratory Rate  13   03/06/19 17:25


 


Blood Pressure  131/79   03/06/19 17:25


 


O2 Sat by Pulse Oximetry (%)  100   03/06/19 17:25











Constitutional: Yes: No Distress


HENT: Yes: Atraumatic


Neck: Yes: Supple


Cardiovascular: Yes: Regular Rate and Rhythm


Respiratory: Yes: CTA Bilaterally


Gastrointestinal: Yes: Normal Bowel Sounds


Extremities: Yes: Other (graft done dressing in place)


Neurological: Yes: Alert, Oriented


Labs: 


 CBC, BMP





 03/04/19 06:00 





 03/04/19 06:00 











Problem List





- Problems


(1) HTN (hypertension)


Assessment/Plan: 


on meds


monitor


Code(s): I10 - ESSENTIAL (PRIMARY) HYPERTENSION   





(2) HLD (hyperlipidemia)


Assessment/Plan: 


on meds


Code(s): E78.5 - HYPERLIPIDEMIA, UNSPECIFIED   





(3) Diabetes


Assessment/Plan: 


on po meds


monitor bgms


Code(s): E11.9 - TYPE 2 DIABETES MELLITUS WITHOUT COMPLICATIONS   





(4) Cellulitis, leg


Assessment/Plan: 


on iv abx


graft done today


Code(s): L03.119 - CELLULITIS OF UNSPECIFIED PART OF LIMB   





(5) Infected wound


Assessment/Plan: 


iv abx





Code(s): T14.8XXA - OTHER INJURY OF UNSPECIFIED BODY REGION, INITIAL ENCOUNTER; 

L08.9 - LOCAL INFECTION OF THE SKIN AND SUBCUTANEOUS TISSUE, UNSP

## 2019-03-07 LAB
ALBUMIN SERPL-MCNC: 2.7 G/DL (ref 3.4–5)
ALP SERPL-CCNC: 72 U/L (ref 45–117)
ALT SERPL-CCNC: 18 U/L (ref 13–61)
ANION GAP SERPL CALC-SCNC: 6 MMOL/L (ref 8–16)
AST SERPL-CCNC: 14 U/L (ref 15–37)
BASOPHILS # BLD: 1 % (ref 0–2)
BILIRUB SERPL-MCNC: 0.6 MG/DL (ref 0.2–1)
BUN SERPL-MCNC: 17 MG/DL (ref 7–18)
CALCIUM SERPL-MCNC: 7.8 MG/DL (ref 8.5–10.1)
CHLORIDE SERPL-SCNC: 104 MMOL/L (ref 98–107)
CO2 SERPL-SCNC: 28 MMOL/L (ref 21–32)
CREAT SERPL-MCNC: 0.7 MG/DL (ref 0.55–1.3)
DEPRECATED RDW RBC AUTO: 16.3 % (ref 11.9–15.9)
EOSINOPHIL # BLD: 3.8 % (ref 0–4.5)
GLUCOSE SERPL-MCNC: 113 MG/DL (ref 74–106)
HCT VFR BLD CALC: 32.1 % (ref 35.4–49)
HGB BLD-MCNC: 11.1 GM/DL (ref 11.7–16.9)
LYMPHOCYTES # BLD: 20.2 % (ref 8–40)
MCH RBC QN AUTO: 30.7 PG (ref 25.7–33.7)
MCHC RBC AUTO-ENTMCNC: 34.4 G/DL (ref 32–35.9)
MCV RBC: 89 FL (ref 80–96)
MONOCYTES # BLD AUTO: 11.1 % (ref 3.8–10.2)
NEUTROPHILS # BLD: 63.9 % (ref 42.8–82.8)
PLATELET # BLD AUTO: 157 K/MM3 (ref 134–434)
PMV BLD: 8.2 FL (ref 7.5–11.1)
POTASSIUM SERPLBLD-SCNC: 4.1 MMOL/L (ref 3.5–5.1)
PROT SERPL-MCNC: 5.6 G/DL (ref 6.4–8.2)
RBC # BLD AUTO: 3.61 M/MM3 (ref 4–5.6)
SODIUM SERPL-SCNC: 138 MMOL/L (ref 136–145)
WBC # BLD AUTO: 6.1 K/MM3 (ref 4–10)

## 2019-03-07 RX ADMIN — SODIUM CHLORIDE, POTASSIUM CHLORIDE, SODIUM LACTATE AND CALCIUM CHLORIDE SCH: 600; 310; 30; 20 INJECTION, SOLUTION INTRAVENOUS at 17:03

## 2019-03-07 RX ADMIN — PIPERACILLIN SODIUM,TAZOBACTAM SODIUM SCH MLS/HR: 3; .375 INJECTION, POWDER, FOR SOLUTION INTRAVENOUS at 01:43

## 2019-03-07 RX ADMIN — PIPERACILLIN SODIUM,TAZOBACTAM SODIUM SCH MLS/HR: 3; .375 INJECTION, POWDER, FOR SOLUTION INTRAVENOUS at 17:16

## 2019-03-07 RX ADMIN — SILVER SULFADIAZINE SCH: 10 CREAM TOPICAL at 22:07

## 2019-03-07 RX ADMIN — LEVOTHYROXINE SODIUM SCH MCG: 75 TABLET ORAL at 06:52

## 2019-03-07 RX ADMIN — ASPIRIN 81 MG SCH MG: 81 TABLET ORAL at 12:33

## 2019-03-07 RX ADMIN — SODIUM CHLORIDE, POTASSIUM CHLORIDE, SODIUM LACTATE AND CALCIUM CHLORIDE SCH MLS/HR: 600; 310; 30; 20 INJECTION, SOLUTION INTRAVENOUS at 17:18

## 2019-03-07 RX ADMIN — SILVER SULFADIAZINE SCH: 10 CREAM TOPICAL at 16:55

## 2019-03-07 RX ADMIN — CLOPIDOGREL BISULFATE SCH MG: 75 TABLET, FILM COATED ORAL at 12:33

## 2019-03-07 RX ADMIN — SODIUM CHLORIDE, POTASSIUM CHLORIDE, SODIUM LACTATE AND CALCIUM CHLORIDE SCH MLS/HR: 600; 310; 30; 20 INJECTION, SOLUTION INTRAVENOUS at 01:45

## 2019-03-07 RX ADMIN — PIPERACILLIN SODIUM,TAZOBACTAM SODIUM SCH MLS/HR: 3; .375 INJECTION, POWDER, FOR SOLUTION INTRAVENOUS at 09:39

## 2019-03-07 RX ADMIN — METOPROLOL TARTRATE SCH MG: 25 TABLET, FILM COATED ORAL at 09:39

## 2019-03-07 RX ADMIN — ATORVASTATIN CALCIUM SCH MG: 40 TABLET, FILM COATED ORAL at 22:07

## 2019-03-07 NOTE — PN
Progress Note, Physician


History of Present Illness: 





comfortable





- Current Medication List


Current Medications: 


Active Medications





Acetaminophen (Tylenol -)  650 mg PO Q6H PRN


   PRN Reason: FEVER


   Last Admin: 03/06/19 21:32 Dose:  650 mg


Aspirin (Asa -)  [facility] mg PO DAILY [facility]


   Last Admin: 03/07/19 12:33 Dose:  81 mg


Atorvastatin Calcium (Lipitor -)  40 mg PO HS [facility]


   Last Admin: 03/06/19 21:32 Dose:  40 mg


Clopidogrel Bisulfate (Plavix -)  75 mg PO DAILY [facility]


   Last Admin: 03/07/19 12:33 Dose:  75 mg


Fentanyl (Sublimaze Injection -)  50 mcg IVPUSH Q5M PRN


   PRN Reason: PAIN-PACU ORDER X 4 DOSES ONLY


   Last Admin: 03/06/19 17:00 Dose:  50 mcg


Piperacillin Sod/Tazobactam (Sod 3.375 gm/ Dextrose)  50 mls @ 100 mls/hr IVPB 

Q8H-IV [facility]; Protocol


   Last Admin: 03/07/19 17:16 Dose:  100 mls/hr


Lactated Ringer's (Lactated Ringers Solution)  1,000 mls @ 125 mls/hr IV [facility] 

[facility]


   Last Admin: 03/07/19 17:18 Dose:  125 mls/hr


Levothyroxine Sodium (Synthroid -)  150 mcg PO AM [facility]


   Last Admin: 03/07/19 06:52 Dose:  150 mcg


Metformin HCl (Glucophage Xr -)  750 mg PO TIDAC [facility]


   Last Admin: 03/07/19 17:24 Dose:  750 mg


Metoprolol Tartrate (Lopressor -)  25 mg PO DAILY [facility]


   Last Admin: 03/07/19 09:39 Dose:  25 mg


Ondansetron HCl (Zofran Injection)  4 mg IVPUSH Q6H PRN


   PRN Reason: NAUSEA AND/OR VOMITING


Ondansetron HCl (Zofran Injection)  4 mg IVPUSH Q6H PRN


   PRN Reason: NAUSEA AND/OR VOMITING


Silver Sulfadiazine (Silvadene -)  1 applic TP BID [facility]


   Last Admin: 03/07/19 16:55 Dose:  Not Given











- Objective


Vital Signs: 


 Vital Signs











Temperature  98 F   03/07/19 16:30


 


Pulse Rate  99 H  03/07/19 16:30


 


Respiratory Rate  20   03/07/19 16:30


 


Blood Pressure  102/60   03/07/19 16:30


 


O2 Sat by Pulse Oximetry (%)  99   03/06/19 17:55











Constitutional: Yes: No Distress


HENT: Yes: Atraumatic


Neck: Yes: Supple


Cardiovascular: Yes: Regular Rate and Rhythm


Respiratory: Yes: CTA Bilaterally


Gastrointestinal: Yes: Normal Bowel Sounds


Extremities: Yes: Other (llex dressing in place)


Neurological: Yes: Alert, Oriented


Labs: 


 CBC, BMP





 03/07/19 06:20 





 03/07/19 06:20 











Problem List





- Problems


(1) HTN (hypertension)


Assessment/Plan: 


on meds


monitor


Code(s): I10 - ESSENTIAL (PRIMARY) HYPERTENSION   





(2) HLD (hyperlipidemia)


Assessment/Plan: 


on meds


Code(s): E78.5 - HYPERLIPIDEMIA, UNSPECIFIED   





(3) Diabetes


Assessment/Plan: 


on po meds


monitor bgms


Code(s): E11.9 - TYPE 2 DIABETES MELLITUS WITHOUT COMPLICATIONS   





(4) Cellulitis, leg


Assessment/Plan: 


on iv abx


graft done today


Code(s): L03.119 - CELLULITIS OF UNSPECIFIED PART OF LIMB   





(5) Infected wound


Assessment/Plan: 


iv abx


s/ surgery


Code(s): T14.8XXA - OTHER INJURY OF UNSPECIFIED BODY REGION, INITIAL ENCOUNTER; 

L08.9 - LOCAL INFECTION OF THE SKIN AND SUBCUTANEOUS TISSUE, UNSP

## 2019-03-07 NOTE — PN
Progress Note, Physician


History of Present Illness: 





stable


doing well


post op


vac present





- Current Medication List


Current Medications: 


Active Medications





Acetaminophen (Tylenol -)  650 mg PO Q6H PRN


   PRN Reason: FEVER


   Last Admin: 03/06/19 21:32 Dose:  650 mg


Aspirin (Asa -)  81 mg PO DAILY [facility]


   Last Admin: 03/07/19 12:33 Dose:  81 mg


Atorvastatin Calcium (Lipitor -)  40 mg PO HS [facility]


   Last Admin: 03/06/19 21:32 Dose:  40 mg


Clopidogrel Bisulfate (Plavix -)  75 mg PO DAILY [facility]


   Last Admin: 03/07/19 12:33 Dose:  75 mg


Fentanyl (Sublimaze Injection -)  50 mcg IVPUSH Q5M PRN


   PRN Reason: PAIN-PACU ORDER X 4 DOSES ONLY


   Last Admin: 03/06/19 17:00 Dose:  50 mcg


Piperacillin Sod/Tazobactam (Sod 3.375 gm/ Dextrose)  50 mls @ 100 mls/hr IVPB 

Q8H-IV [facility]; Protocol


   Last Admin: 03/07/19 09:39 Dose:  100 mls/hr


Lactated Ringer's (Lactated Ringers Solution)  1,000 mls @ 125 mls/hr IV ASDIR 

[facility]


   Last Admin: 03/07/19 01:45 Dose:  125 mls/hr


Levothyroxine Sodium (Synthroid -)  150 mcg PO AM [facility]


   Last Admin: 03/07/19 06:52 Dose:  150 mcg


Metformin HCl (Glucophage Xr -)  750 mg PO TIDAC [facility]


   Last Admin: 03/07/19 12:34 Dose:  750 mg


Metoprolol Tartrate (Lopressor -)  25 mg PO DAILY [facility]


   Last Admin: 03/07/19 09:39 Dose:  25 mg


Ondansetron HCl (Zofran Injection)  4 mg IVPUSH Q6H PRN


   PRN Reason: NAUSEA AND/OR VOMITING


Ondansetron HCl (Zofran Injection)  4 mg IVPUSH Q6H PRN


   PRN Reason: NAUSEA AND/OR VOMITING


Silver Sulfadiazine (Silvadene -)  1 applic TP BID [facility]


   Last Admin: 03/06/19 10:38 Dose:  1 applic











- Objective


Vital Signs: 


 Vital Signs











Temperature  97.6 F   03/07/19 05:58


 


Pulse Rate  62   03/07/19 05:58


 


Respiratory Rate  20   03/07/19 05:58


 


Blood Pressure  100/55 L  03/07/19 05:58


 


O2 Sat by Pulse Oximetry (%)  99   03/06/19 17:55











Constitutional: Yes: No Distress, Calm


Cardiovascular: Yes: Regular Rate and Rhythm


Respiratory: Yes: Regular, CTA Bilaterally


Gastrointestinal: Yes: Normal Bowel Sounds, Soft


Musculoskeletal: Yes: WNL


Extremities: Yes: Other


Wound/Incision: Yes: Dressing Dry and Intact, Other (wound vac in place)


Neurological: Yes: Alert, Oriented


Psychiatric: Yes: Alert, Oriented


Labs: 


 CBC, BMP





 03/07/19 06:20 





 03/07/19 06:20 











Assessment/Plan








 Problem List 





- Problems


(1) HTN (hypertension)


Code(s): I10 - ESSENTIAL (PRIMARY) HYPERTENSION   





(2) HLD (hyperlipidemia)


Code(s): E78.5 - HYPERLIPIDEMIA, UNSPECIFIED   





(3) Diabetes


Code(s): E11.9 - TYPE 2 DIABETES MELLITUS WITHOUT COMPLICATIONS   





(4) Cellulitis, leg


Code(s): L03.119 - CELLULITIS OF UNSPECIFIED PART OF LIMB   





(5) Infected wound


Code(s): T14.8XXA - OTHER INJURY OF UNSPECIFIED BODY REGION, INITIAL ENCOUNTER; 

L08.9 - LOCAL INFECTION OF THE SKIN AND SUBCUTANEOUS TISSUE, UNSP   





plan


continue abx


post op


will await for wound to be evaluated


rest as per the team

## 2019-03-07 NOTE — PN
Progress Note (short form)





- Note


Progress Note: 





POD #1 - s/p left lower extremity I&D and wound vac application. VSS. Pt. 

resting comfortably


in bed. No apparent anesthetic complications noted. Continue current care.

## 2019-03-07 NOTE — OP
DATE OF OPERATION:  

 

DATE OF DICTATION:  03/06/2019

 

PREOPERATIVE DIAGNOSIS:  Diabetic open wound, left leg.  

 

POSTOPERATIVE DIAGNOSIS:  Diabetic open wound, left leg.  

 

PROCEDURE:  Split-thickness skin grafting left leg, over 30 x 7 cm.

 

SURGEON:  Ramona Montaño M.D. 

 

TYPE OF ANESTHESIA:  General.  

 

ESTIMATED BLOOD LOSS:  Minimal.  

 

PROCEDURE:

1.  Debridement.  

2.  Removal foreign body.

3.  Split thickness skin graft, 12th of an inch, over 250 sq cm.  

 

DESCRIPTION OF PROCEDURE:  The patient was brought to the operating room.  He was

transferred over to the table and placed in the right lateral position.  Informed

consent had been taken.  At this time, general anesthesia with intubation was done. 

Following that he was placed in the right lateral position on a beanbag.  All bony

prominences and skin was protected with padding.  

 

Left leg and thigh was washed and cleaned with Betadine soak and solution and then

draped in a standard aseptic manner.

 

Standard timeout was carried out.  Identification of the site and the procedure was

agreed.  Standard draping was performed.  

 

Using Ian dermatome, a 12th of an inch skin graft was taken from the lateral

thigh.  _____ times.  Kept in a saline sponge separately.  

 

Excisional debridement of left lower leg wound was carried out, which was over 30 cm

x 7 cm.  After debridement, particles of foreign bodies were removed.  These were

some particles from the black foam from the VAC dressing.  Following debridement,

split thickness skin graft was then applied, and was surgically immobilized using

staples.  

 

Dressing Xeroform.

 

Application of VAC dressing applied at 125 continuous mmHg.  

 

Circulation to the foot was satisfactory.  Patient was extubated, sent to recovery

room in a satisfactory condition.  

 

 

RAMONA MONTAÑO M.D.

 

[id number]

DD: 03/06/2019 16:36

DT: 03/06/2019 19:05

Job #:  [id number]

## 2019-03-08 RX ADMIN — CLOPIDOGREL BISULFATE SCH MG: 75 TABLET, FILM COATED ORAL at 09:53

## 2019-03-08 RX ADMIN — ATORVASTATIN CALCIUM SCH MG: 40 TABLET, FILM COATED ORAL at 22:00

## 2019-03-08 RX ADMIN — SILVER SULFADIAZINE SCH: 10 CREAM TOPICAL at 23:41

## 2019-03-08 RX ADMIN — SILVER SULFADIAZINE SCH: 10 CREAM TOPICAL at 12:51

## 2019-03-08 RX ADMIN — SODIUM CHLORIDE, POTASSIUM CHLORIDE, SODIUM LACTATE AND CALCIUM CHLORIDE SCH MLS/HR: 600; 310; 30; 20 INJECTION, SOLUTION INTRAVENOUS at 01:19

## 2019-03-08 RX ADMIN — SODIUM CHLORIDE, POTASSIUM CHLORIDE, SODIUM LACTATE AND CALCIUM CHLORIDE SCH MLS/HR: 600; 310; 30; 20 INJECTION, SOLUTION INTRAVENOUS at 20:00

## 2019-03-08 RX ADMIN — PIPERACILLIN SODIUM,TAZOBACTAM SODIUM SCH MLS/HR: 3; .375 INJECTION, POWDER, FOR SOLUTION INTRAVENOUS at 09:50

## 2019-03-08 RX ADMIN — SODIUM CHLORIDE, POTASSIUM CHLORIDE, SODIUM LACTATE AND CALCIUM CHLORIDE SCH MLS/HR: 600; 310; 30; 20 INJECTION, SOLUTION INTRAVENOUS at 09:49

## 2019-03-08 RX ADMIN — PIPERACILLIN SODIUM,TAZOBACTAM SODIUM SCH MLS/HR: 3; .375 INJECTION, POWDER, FOR SOLUTION INTRAVENOUS at 01:17

## 2019-03-08 RX ADMIN — ASPIRIN 81 MG SCH MG: 81 TABLET ORAL at 09:53

## 2019-03-08 RX ADMIN — PIPERACILLIN SODIUM,TAZOBACTAM SODIUM SCH MLS/HR: 3; .375 INJECTION, POWDER, FOR SOLUTION INTRAVENOUS at 17:49

## 2019-03-08 RX ADMIN — METOPROLOL TARTRATE SCH MG: 25 TABLET, FILM COATED ORAL at 09:53

## 2019-03-08 RX ADMIN — SODIUM CHLORIDE, POTASSIUM CHLORIDE, SODIUM LACTATE AND CALCIUM CHLORIDE SCH MLS/HR: 600; 310; 30; 20 INJECTION, SOLUTION INTRAVENOUS at 17:49

## 2019-03-08 RX ADMIN — LEVOTHYROXINE SODIUM SCH MCG: 75 TABLET ORAL at 06:32

## 2019-03-08 NOTE — PN
Progress Note, Physician


History of Present Illness: 





patient doing well


no issues








- Current Medication List


Current Medications: 


Active Medications





Acetaminophen (Tylenol -)  650 mg PO Q6H PRN


   PRN Reason: FEVER


   Last Admin: 03/06/19 21:32 Dose:  650 mg


Aspirin (Asa -)  81 mg PO DAILY [facility]


   Last Admin: 03/07/19 12:33 Dose:  81 mg


Atorvastatin Calcium (Lipitor -)  40 mg PO HS [facility]


   Last Admin: 03/07/19 22:07 Dose:  40 mg


Clopidogrel Bisulfate (Plavix -)  75 mg PO DAILY [facility]


   Last Admin: 03/07/19 12:33 Dose:  75 mg


Fentanyl (Sublimaze Injection -)  50 mcg IVPUSH Q5M PRN


   PRN Reason: PAIN-PACU ORDER X 4 DOSES ONLY


   Last Admin: 03/06/19 17:00 Dose:  50 mcg


Piperacillin Sod/Tazobactam (Sod 3.375 gm/ Dextrose)  50 mls @ 100 mls/hr IVPB 

Q8H-IV [facility]; Protocol


   Last Admin: 03/08/19 01:17 Dose:  100 mls/hr


Lactated Ringer's (Lactated Ringers Solution)  1,000 mls @ 125 mls/hr IV ASDIR 

[facility]


   Last Admin: 03/08/19 01:19 Dose:  125 mls/hr


Levothyroxine Sodium (Synthroid -)  150 mcg PO AM [facility]


   Last Admin: 03/08/19 06:32 Dose:  150 mcg


Metformin HCl (Glucophage Xr -)  750 mg PO TIDAC [facility]


   Last Admin: 03/08/19 06:32 Dose:  750 mg


Metoprolol Tartrate (Lopressor -)  25 mg PO DAILY [facility]


   Last Admin: 03/07/19 09:39 Dose:  25 mg


Ondansetron HCl (Zofran Injection)  4 mg IVPUSH Q6H PRN


   PRN Reason: NAUSEA AND/OR VOMITING


Ondansetron HCl (Zofran Injection)  4 mg IVPUSH Q6H PRN


   PRN Reason: NAUSEA AND/OR VOMITING


Silver Sulfadiazine (Silvadene -)  1 applic TP BID [facility]


   Last Admin: 03/07/19 22:07 Dose:  Not Given











- Objective


Vital Signs: 


 Vital Signs











Temperature  97.9 F   03/08/19 07:00


 


Pulse Rate  62   03/08/19 07:00


 


Respiratory Rate  20   03/08/19 07:00


 


Blood Pressure  149/75   03/08/19 07:00


 


O2 Sat by Pulse Oximetry (%)  99   03/06/19 17:55











Constitutional: Yes: No Distress, Calm


Cardiovascular: Yes: Regular Rate and Rhythm


Respiratory: Yes: Regular, CTA Bilaterally


Gastrointestinal: Yes: Normal Bowel Sounds, Soft


Musculoskeletal: Yes: WNL


Extremities: Yes: Other


Wound/Incision: Yes: Dressing Dry and Intact, Other (wound vac in place)


Neurological: Yes: Alert, Oriented


Psychiatric: Yes: Alert, Oriented


Labs: 


 CBC, BMP





 03/07/19 06:20 





 03/07/19 06:20 











Assessment/Plan








 Problem List 





- Problems


(1) HTN (hypertension)


Code(s): I10 - ESSENTIAL (PRIMARY) HYPERTENSION   





(2) HLD (hyperlipidemia)


Code(s): E78.5 - HYPERLIPIDEMIA, UNSPECIFIED   





(3) Diabetes


Code(s): E11.9 - TYPE 2 DIABETES MELLITUS WITHOUT COMPLICATIONS   





(4) Cellulitis, leg


Code(s): L03.119 - CELLULITIS OF UNSPECIFIED PART OF LIMB   





(5) Infected wound


Code(s): T14.8XXA - OTHER INJURY OF UNSPECIFIED BODY REGION, INITIAL ENCOUNTER; 

L08.9 - LOCAL INFECTION OF THE SKIN AND SUBCUTANEOUS TISSUE, UNSP   





plan


continue abx


awaiting for wound evaluation


will d/w the surgical team further plan


if ok then will switch to oral abx


rest continue current mgmt PAIN SCALE 5 OF 10.

## 2019-03-08 NOTE — PATH
Surgical Pathology Report



Patient Name:  CARMEN ARORA

Accession #:  [id number]

Med. Rec. #:  [id number]                                                        

   /Age/Gender:  1933 (Age: 85) / M

Account:  [id number]                                                          

             Location: [facility] MED/SURG

Taken:  3/6/2019

Received:  3/7/2019

Reported:  3/8/2019

Physicians:  Jame Saenz M.D.

  



Specimen(s) Received

 DEBRIDED TISSUE LEFT LEG 





Clinical History

Wound left leg







Final Diagnosis

DEBRIDED TISSUE, LEFT LEG, EXCISION:

ACUTE INFLAMMATORY EXUDATE AND FRAGMENTS OF GRANULATION TISSUE WITH SEVERE ACUTE

AND CHRONIC INFLAMMATION.





***Electronically Signed***

Melody Evans M.D.





Gross Description

Received in formalin labeled "debrided tissue left leg," is a 1.5 x 1.1 x 0.3 cm

aggregate of tan-gray soft tissue fragments. The specimen is submitted in toto

in one cassette.

/3/7/2019



saudi/3/7/2019

## 2019-03-08 NOTE — PN
Progress Note, Physician


History of Present Illness: 





comfortable





- Current Medication List


Current Medications: 


Active Medications





Acetaminophen (Tylenol -)  650 mg PO Q6H PRN


   PRN Reason: FEVER


   Last Admin: 03/06/19 21:32 Dose:  650 mg


Aspirin (Asa -)  81 mg PO DAILY [facility]


   Last Admin: 03/08/19 09:53 Dose:  81 mg


Atorvastatin Calcium (Lipitor -)  40 mg PO HS [facility]


   Last Admin: 03/08/19 22:00 Dose:  40 mg


Clopidogrel Bisulfate (Plavix -)  75 mg PO DAILY [facility]


   Last Admin: 03/08/19 09:53 Dose:  75 mg


Fentanyl (Sublimaze Injection -)  50 mcg IVPUSH Q5M PRN


   PRN Reason: PAIN-PACU ORDER X 4 DOSES ONLY


   Last Admin: 03/06/19 17:00 Dose:  50 mcg


Piperacillin Sod/Tazobactam (Sod 3.375 gm/ Dextrose)  50 mls @ 100 mls/hr IVPB 

Q8H-IV TRACI; Protocol


   Last Admin: 03/08/19 17:49 Dose:  100 mls/hr


Lactated Ringer's (Lactated Ringers Solution)  1,000 mls @ 125 mls/hr IV ASDIR 

[facility]


   Last Admin: 03/08/19 17:49 Dose:  125 mls/hr


Levothyroxine Sodium (Synthroid -)  150 mcg PO AM [facility]


   Last Admin: 03/08/19 06:32 Dose:  150 mcg


Metformin HCl (Glucophage Xr -)  750 mg PO TIDAC [facility]


   Last Admin: 03/08/19 16:50 Dose:  750 mg


Metoprolol Tartrate (Lopressor -)  25 mg PO DAILY [facility]


   Last Admin: 03/08/19 09:53 Dose:  25 mg


Ondansetron HCl (Zofran Injection)  4 mg IVPUSH Q6H PRN


   PRN Reason: NAUSEA AND/OR VOMITING


Ondansetron HCl (Zofran Injection)  4 mg IVPUSH Q6H PRN


   PRN Reason: NAUSEA AND/OR VOMITING


Silver Sulfadiazine (Silvadene -)  1 applic TP BID [facility]


   Last Admin: 03/08/19 23:41 Dose:  Not Given











- Objective


Vital Signs: 


 Vital Signs











Temperature  97.3 F L  03/08/19 17:09


 


Pulse Rate  62   03/08/19 17:09


 


Respiratory Rate  20   03/08/19 17:09


 


Blood Pressure  128/73   03/08/19 17:09


 


O2 Sat by Pulse Oximetry (%)  99   03/06/19 17:55











Constitutional: Yes: No Distress


HENT: Yes: Atraumatic


Neck: Yes: Supple


Cardiovascular: Yes: Regular Rate and Rhythm


Respiratory: Yes: CTA Bilaterally


Gastrointestinal: Yes: Normal Bowel Sounds


Extremities: Yes: Other (llex in dressing)


Neurological: Yes: Alert, Oriented


Labs: 


 CBC, BMP





 03/07/19 06:20 





 03/07/19 06:20 











Problem List





- Problems


(1) HTN (hypertension)


Assessment/Plan: 


on meds


monitor


Code(s): I10 - ESSENTIAL (PRIMARY) HYPERTENSION   





(2) HLD (hyperlipidemia)


Assessment/Plan: 


on meds


Code(s): E78.5 - HYPERLIPIDEMIA, UNSPECIFIED   





(3) Diabetes


Assessment/Plan: 


on po meds


monitor bgms


Code(s): E11.9 - TYPE 2 DIABETES MELLITUS WITHOUT COMPLICATIONS   





(4) Cellulitis, leg


Assessment/Plan: 


on iv abx


graft done today


Code(s): L03.119 - CELLULITIS OF UNSPECIFIED PART OF LIMB   





(5) Infected wound


Assessment/Plan: 


iv abx


s/ surgery


Code(s): T14.8XXA - OTHER INJURY OF UNSPECIFIED BODY REGION, INITIAL ENCOUNTER; 

L08.9 - LOCAL INFECTION OF THE SKIN AND SUBCUTANEOUS TISSUE, UNSP

## 2019-03-09 LAB
ALBUMIN SERPL-MCNC: 2.8 G/DL (ref 3.4–5)
ALP SERPL-CCNC: 81 U/L (ref 45–117)
ALT SERPL-CCNC: 14 U/L (ref 13–61)
ANION GAP SERPL CALC-SCNC: 6 MMOL/L (ref 8–16)
AST SERPL-CCNC: 11 U/L (ref 15–37)
BASOPHILS # BLD: 0.6 % (ref 0–2)
BILIRUB SERPL-MCNC: 0.4 MG/DL (ref 0.2–1)
BUN SERPL-MCNC: 15 MG/DL (ref 7–18)
CALCIUM SERPL-MCNC: 8.4 MG/DL (ref 8.5–10.1)
CHLORIDE SERPL-SCNC: 104 MMOL/L (ref 98–107)
CO2 SERPL-SCNC: 29 MMOL/L (ref 21–32)
CREAT SERPL-MCNC: 1 MG/DL (ref 0.55–1.3)
DEPRECATED RDW RBC AUTO: 16.7 % (ref 11.9–15.9)
EOSINOPHIL # BLD: 5.3 % (ref 0–4.5)
GLUCOSE SERPL-MCNC: 126 MG/DL (ref 74–106)
HCT VFR BLD CALC: 34.1 % (ref 35.4–49)
HGB BLD-MCNC: 11.9 GM/DL (ref 11.7–16.9)
LYMPHOCYTES # BLD: 14.2 % (ref 8–40)
MCH RBC QN AUTO: 31.4 PG (ref 25.7–33.7)
MCHC RBC AUTO-ENTMCNC: 35 G/DL (ref 32–35.9)
MCV RBC: 89.7 FL (ref 80–96)
MONOCYTES # BLD AUTO: 10.1 % (ref 3.8–10.2)
NEUTROPHILS # BLD: 69.8 % (ref 42.8–82.8)
PLATELET # BLD AUTO: 163 K/MM3 (ref 134–434)
PMV BLD: 8 FL (ref 7.5–11.1)
POTASSIUM SERPLBLD-SCNC: 4 MMOL/L (ref 3.5–5.1)
PROT SERPL-MCNC: 6.1 G/DL (ref 6.4–8.2)
RBC # BLD AUTO: 3.8 M/MM3 (ref 4–5.6)
SODIUM SERPL-SCNC: 140 MMOL/L (ref 136–145)
WBC # BLD AUTO: 6 K/MM3 (ref 4–10)

## 2019-03-09 RX ADMIN — SODIUM CHLORIDE, POTASSIUM CHLORIDE, SODIUM LACTATE AND CALCIUM CHLORIDE SCH MLS/HR: 600; 310; 30; 20 INJECTION, SOLUTION INTRAVENOUS at 03:48

## 2019-03-09 RX ADMIN — SILVER SULFADIAZINE SCH: 10 CREAM TOPICAL at 23:12

## 2019-03-09 RX ADMIN — PIPERACILLIN SODIUM,TAZOBACTAM SODIUM SCH MLS/HR: 3; .375 INJECTION, POWDER, FOR SOLUTION INTRAVENOUS at 01:40

## 2019-03-09 RX ADMIN — PIPERACILLIN SODIUM,TAZOBACTAM SODIUM SCH MLS/HR: 3; .375 INJECTION, POWDER, FOR SOLUTION INTRAVENOUS at 09:36

## 2019-03-09 RX ADMIN — ATORVASTATIN CALCIUM SCH MG: 40 TABLET, FILM COATED ORAL at 23:05

## 2019-03-09 RX ADMIN — CLOPIDOGREL BISULFATE SCH MG: 75 TABLET, FILM COATED ORAL at 09:36

## 2019-03-09 RX ADMIN — SILVER SULFADIAZINE SCH APPLIC: 10 CREAM TOPICAL at 23:07

## 2019-03-09 RX ADMIN — ASPIRIN 81 MG SCH MG: 81 TABLET ORAL at 09:36

## 2019-03-09 RX ADMIN — SILVER SULFADIAZINE SCH: 10 CREAM TOPICAL at 11:24

## 2019-03-09 RX ADMIN — SODIUM CHLORIDE, POTASSIUM CHLORIDE, SODIUM LACTATE AND CALCIUM CHLORIDE SCH MLS/HR: 600; 310; 30; 20 INJECTION, SOLUTION INTRAVENOUS at 23:05

## 2019-03-09 RX ADMIN — PIPERACILLIN SODIUM,TAZOBACTAM SODIUM SCH MLS/HR: 3; .375 INJECTION, POWDER, FOR SOLUTION INTRAVENOUS at 17:47

## 2019-03-09 RX ADMIN — METOPROLOL TARTRATE SCH MG: 25 TABLET, FILM COATED ORAL at 09:36

## 2019-03-09 RX ADMIN — LEVOTHYROXINE SODIUM SCH MCG: 75 TABLET ORAL at 06:26

## 2019-03-09 NOTE — PN
Progress Note, Physician


History of Present Illness: 





comfortable





- Current Medication List


Current Medications: 


Active Medications





Acetaminophen (Tylenol -)  650 mg PO Q6H PRN


   PRN Reason: FEVER


   Last Admin: 03/06/19 21:32 Dose:  650 mg


Aspirin (Asa -)  81 mg PO DAILY [facility]


   Last Admin: 03/09/19 09:36 Dose:  81 mg


Atorvastatin Calcium (Lipitor -)  40 mg PO HS [facility]


   Last Admin: 03/08/19 22:00 Dose:  40 mg


Clopidogrel Bisulfate (Plavix -)  75 mg PO DAILY [facility]


   Last Admin: 03/09/19 09:36 Dose:  75 mg


Fentanyl (Sublimaze Injection -)  50 mcg IVPUSH Q5M PRN


   PRN Reason: PAIN-PACU ORDER X 4 DOSES ONLY


   Last Admin: 03/06/19 17:00 Dose:  50 mcg


Piperacillin Sod/Tazobactam (Sod 3.375 gm/ Dextrose)  50 mls @ 100 mls/hr IVPB 

Q8H-IV TRACI; Protocol


   Last Admin: 03/09/19 09:36 Dose:  100 mls/hr


Lactated Ringer's (Lactated Ringers Solution)  1,000 mls @ 125 mls/hr IV ASDIR 

[facility]


   Last Admin: 03/09/19 03:48 Dose:  125 mls/hr


Levothyroxine Sodium (Synthroid -)  150 mcg PO AM [facility]


   Last Admin: 03/09/19 06:26 Dose:  150 mcg


Metformin HCl (Glucophage Xr -)  750 mg PO TIDAC [facility]


   Last Admin: 03/09/19 11:24 Dose:  750 mg


Metoprolol Tartrate (Lopressor -)  25 mg PO DAILY [facility]


   Last Admin: 03/09/19 09:36 Dose:  25 mg


Ondansetron HCl (Zofran Injection)  4 mg IVPUSH Q6H PRN


   PRN Reason: NAUSEA AND/OR VOMITING


Ondansetron HCl (Zofran Injection)  4 mg IVPUSH Q6H PRN


   PRN Reason: NAUSEA AND/OR VOMITING


Silver Sulfadiazine (Silvadene -)  1 applic TP BID [facility]


   Last Admin: 03/09/19 11:24 Dose:  Not Given











- Objective


Vital Signs: 


 Vital Signs











Temperature  98.3 F   03/09/19 09:00


 


Pulse Rate  77   03/09/19 09:00


 


Respiratory Rate  20   03/09/19 09:00


 


Blood Pressure  106/65   03/09/19 09:00


 


O2 Sat by Pulse Oximetry (%)  99   03/06/19 17:55











Constitutional: Yes: No Distress


HENT: Yes: Atraumatic


Neck: Yes: Supple


Cardiovascular: Yes: Regular Rate and Rhythm


Respiratory: Yes: CTA Bilaterally


Gastrointestinal: Yes: Normal Bowel Sounds


Extremities: Yes: Other (llex cellulitis/sp graft..dressing in place)


Edema: No


Peripheral Pulses WNL: Yes


Neurological: Yes: Alert, Oriented


Labs: 


 CBC, BMP





 03/07/19 06:20 





 03/07/19 06:20 











Problem List





- Problems


(1) HTN (hypertension)


Assessment/Plan: 


on meds


monitor


Code(s): I10 - ESSENTIAL (PRIMARY) HYPERTENSION   





(2) HLD (hyperlipidemia)


Assessment/Plan: 


on meds


Code(s): E78.5 - HYPERLIPIDEMIA, UNSPECIFIED   





(3) Diabetes


Assessment/Plan: 


on po meds


monitor bgms


Code(s): E11.9 - TYPE 2 DIABETES MELLITUS WITHOUT COMPLICATIONS   





(4) Cellulitis, leg


Assessment/Plan: 


on iv abx


graft done today


Code(s): L03.119 - CELLULITIS OF UNSPECIFIED PART OF LIMB   





(5) Infected wound


Assessment/Plan: 


iv abx


s/ surgery


Code(s): T14.8XXA - OTHER INJURY OF UNSPECIFIED BODY REGION, INITIAL ENCOUNTER; 

L08.9 - LOCAL INFECTION OF THE SKIN AND SUBCUTANEOUS TISSUE, UNSP

## 2019-03-09 NOTE — PN
Progress Note, Physician


Chief Complaint: 


patient stable


no new issues





- Current Medication List


Current Medications: 


Active Medications





Acetaminophen (Tylenol -)  650 mg PO Q6H PRN


   PRN Reason: FEVER


   Last Admin: 03/06/19 21:32 Dose:  650 mg


Aspirin (Asa -)  81 mg PO DAILY [facility]


   Last Admin: 03/09/19 09:36 Dose:  81 mg


Atorvastatin Calcium (Lipitor -)  40 mg PO HS [facility]


   Last Admin: 03/08/19 22:00 Dose:  40 mg


Clopidogrel Bisulfate (Plavix -)  75 mg PO DAILY [facility]


   Last Admin: 03/09/19 09:36 Dose:  75 mg


Fentanyl (Sublimaze Injection -)  50 mcg IVPUSH Q5M PRN


   PRN Reason: PAIN-PACU ORDER X 4 DOSES ONLY


   Last Admin: 03/06/19 17:00 Dose:  50 mcg


Piperacillin Sod/Tazobactam (Sod 3.375 gm/ Dextrose)  50 mls @ 100 mls/hr IVPB 

Q8H-IV [facility]I; Protocol


   Last Admin: 03/09/19 09:36 Dose:  100 mls/hr


Lactated Ringer's (Lactated Ringers Solution)  1,000 mls @ 125 mls/hr IV ASDIR 

[facility]


   Last Admin: 03/09/19 03:48 Dose:  125 mls/hr


Levothyroxine Sodium (Synthroid -)  150 mcg PO AM [facility]


   Last Admin: 03/09/19 06:26 Dose:  150 mcg


Metformin HCl (Glucophage Xr -)  750 mg PO TIDAC [facility]


   Last Admin: 03/09/19 11:24 Dose:  750 mg


Metoprolol Tartrate (Lopressor -)  25 mg PO DAILY [facility]


   Last Admin: 03/09/19 09:36 Dose:  25 mg


Ondansetron HCl (Zofran Injection)  4 mg IVPUSH Q6H PRN


   PRN Reason: NAUSEA AND/OR VOMITING


Ondansetron HCl (Zofran Injection)  4 mg IVPUSH Q6H PRN


   PRN Reason: NAUSEA AND/OR VOMITING


Silver Sulfadiazine (Silvadene -)  1 applic TP BID [facility]


   Last Admin: 03/09/19 11:24 Dose:  Not Given











- Objective


Vital Signs: 


 Vital Signs











Temperature  98.3 F   03/09/19 09:00


 


Pulse Rate  77   03/09/19 09:00


 


Respiratory Rate  20   03/09/19 09:00


 


Blood Pressure  106/65   03/09/19 09:00


 


O2 Sat by Pulse Oximetry (%)  99   03/06/19 17:55











Constitutional: Yes: No Distress, Calm


Cardiovascular: Yes: S1, S2


Respiratory: Yes: Regular, CTA Bilaterally


Gastrointestinal: Yes: Normal Bowel Sounds, Soft


Musculoskeletal: Yes: WNL


Extremities: Yes: Other


Wound/Incision: Yes: Dressing Dry and Intact, Other (wound vac in place)


Neurological: Yes: Alert, Oriented


Psychiatric: Yes: Alert, Oriented


Labs: 


 CBC, BMP





 03/07/19 06:20 





 03/07/19 06:20 











Assessment/Plan








 Problem List 





- Problems


(1) HTN (hypertension)


Code(s): I10 - ESSENTIAL (PRIMARY) HYPERTENSION   





(2) HLD (hyperlipidemia)


Code(s): E78.5 - HYPERLIPIDEMIA, UNSPECIFIED   





(3) Diabetes


Code(s): E11.9 - TYPE 2 DIABETES MELLITUS WITHOUT COMPLICATIONS   





(4) Cellulitis, leg


Code(s): L03.119 - CELLULITIS OF UNSPECIFIED PART OF LIMB   





(5) Infected wound


Code(s): T14.8XXA - OTHER INJURY OF UNSPECIFIED BODY REGION, INITIAL ENCOUNTER; 

L08.9 - LOCAL INFECTION OF THE SKIN AND SUBCUTANEOUS TISSUE, UNSP   





plan


continue abx


will decide after talking to plastics


rest as per the team


will switch to oral tomorrow

## 2019-03-10 RX ADMIN — PIPERACILLIN SODIUM,TAZOBACTAM SODIUM SCH MLS/HR: 3; .375 INJECTION, POWDER, FOR SOLUTION INTRAVENOUS at 17:42

## 2019-03-10 RX ADMIN — PIPERACILLIN SODIUM,TAZOBACTAM SODIUM SCH MLS/HR: 3; .375 INJECTION, POWDER, FOR SOLUTION INTRAVENOUS at 04:21

## 2019-03-10 RX ADMIN — ATORVASTATIN CALCIUM SCH MG: 40 TABLET, FILM COATED ORAL at 22:20

## 2019-03-10 RX ADMIN — PIPERACILLIN SODIUM,TAZOBACTAM SODIUM SCH MLS/HR: 3; .375 INJECTION, POWDER, FOR SOLUTION INTRAVENOUS at 10:59

## 2019-03-10 RX ADMIN — METOPROLOL TARTRATE SCH MG: 25 TABLET, FILM COATED ORAL at 11:00

## 2019-03-10 RX ADMIN — SILVER SULFADIAZINE SCH: 10 CREAM TOPICAL at 11:03

## 2019-03-10 RX ADMIN — CLOPIDOGREL BISULFATE SCH MG: 75 TABLET, FILM COATED ORAL at 11:00

## 2019-03-10 RX ADMIN — ASPIRIN 81 MG SCH MG: 81 TABLET ORAL at 11:00

## 2019-03-10 RX ADMIN — LEVOTHYROXINE SODIUM SCH MCG: 75 TABLET ORAL at 06:50

## 2019-03-10 RX ADMIN — SILVER SULFADIAZINE SCH: 10 CREAM TOPICAL at 22:20

## 2019-03-10 NOTE — PN
Progress Note, Physician


History of Present Illness: 





comfortable





- Current Medication List


Current Medications: 


Active Medications





Acetaminophen (Tylenol -)  650 mg PO Q6H PRN


   PRN Reason: FEVER


   Last Admin: 03/06/19 21:32 Dose:  650 mg


Aspirin (Asa -)  81 mg PO DAILY [facility]


   Last Admin: 03/10/19 11:00 Dose:  81 mg


Atorvastatin Calcium (Lipitor -)  40 mg PO HS [facility]


   Last Admin: 03/09/19 23:05 Dose:  40 mg


Clopidogrel Bisulfate (Plavix -)  75 mg PO DAILY [facility]


   Last Admin: 03/10/19 11:00 Dose:  75 mg


Fentanyl (Sublimaze Injection -)  50 mcg IVPUSH Q5M PRN


   PRN Reason: PAIN-PACU ORDER X 4 DOSES ONLY


   Last Admin: 03/06/19 17:00 Dose:  50 mcg


Piperacillin Sod/Tazobactam (Sod 3.375 gm/ Dextrose)  50 mls @ 100 mls/hr IVPB 

Q8H-IV TRACI; Protocol


   Last Admin: 03/10/19 17:42 Dose:  100 mls/hr


Lactated Ringer's (Lactated Ringers Solution)  1,000 mls @ 125 mls/hr IV ASDIR 

[facility]


   Last Admin: 03/09/19 23:05 Dose:  125 mls/hr


Levothyroxine Sodium (Synthroid -)  150 mcg PO AM [facility]


   Last Admin: 03/10/19 06:50 Dose:  150 mcg


Metformin HCl (Glucophage Xr -)  750 mg PO TIDAC [facility]


   Last Admin: 03/10/19 17:42 Dose:  750 mg


Metoprolol Tartrate (Lopressor -)  25 mg PO DAILY [facility]


   Last Admin: 03/10/19 11:00 Dose:  25 mg


Ondansetron HCl (Zofran Injection)  4 mg IVPUSH Q6H PRN


   PRN Reason: NAUSEA AND/OR VOMITING


Ondansetron HCl (Zofran Injection)  4 mg IVPUSH Q6H PRN


   PRN Reason: NAUSEA AND/OR VOMITING


Silver Sulfadiazine (Silvadene -)  1 applic TP BID [facility]


   Last Admin: 03/10/19 11:03 Dose:  Not Given











- Objective


Vital Signs: 


 Vital Signs











Temperature  97.7 F   03/09/19 19:52


 


Pulse Rate  71   03/09/19 19:52


 


Respiratory Rate  18   03/10/19 09:00


 


Blood Pressure  181/71 H  03/09/19 19:52


 


O2 Sat by Pulse Oximetry (%)  99   03/06/19 17:55











Constitutional: Yes: No Distress


HENT: Yes: Atraumatic


Neck: Yes: Supple


Cardiovascular: Yes: Regular Rate and Rhythm


Respiratory: Yes: CTA Bilaterally


Gastrointestinal: Yes: Normal Bowel Sounds


Extremities: Yes: Other (llex in dressing)


Neurological: Yes: Alert, Oriented


Labs: 


 CBC, BMP





 03/09/19 14:45 





 03/09/19 14:45 











Problem List





- Problems


(1) HTN (hypertension)


Assessment/Plan: 


on meds


monitor


Code(s): I10 - ESSENTIAL (PRIMARY) HYPERTENSION   





(2) HLD (hyperlipidemia)


Assessment/Plan: 


on meds


Code(s): E78.5 - HYPERLIPIDEMIA, UNSPECIFIED   





(3) Diabetes


Assessment/Plan: 


on po meds


monitor bgms


Code(s): E11.9 - TYPE 2 DIABETES MELLITUS WITHOUT COMPLICATIONS   





(4) Cellulitis, leg


Assessment/Plan: 


on iv abx


graft done today


Code(s): L03.119 - CELLULITIS OF UNSPECIFIED PART OF LIMB   





(5) Infected wound


Assessment/Plan: 


iv abx


s/ surgery


Code(s): T14.8XXA - OTHER INJURY OF UNSPECIFIED BODY REGION, INITIAL ENCOUNTER; 

L08.9 - LOCAL INFECTION OF THE SKIN AND SUBCUTANEOUS TISSUE, UNSP

## 2019-03-10 NOTE — PN
Progress Note, Physician


History of Present Illness: 





patient stable


doing well


no issues





- Current Medication List


Current Medications: 


Active Medications





Acetaminophen (Tylenol -)  650 mg PO Q6H PRN


   PRN Reason: FEVER


   Last Admin: 03/06/19 21:32 Dose:  650 mg


Aspirin (Asa -)  81 mg PO DAILY [facility]


   Last Admin: 03/10/19 11:00 Dose:  81 mg


Atorvastatin Calcium (Lipitor -)  40 mg PO HS [facility]


   Last Admin: 03/09/19 23:05 Dose:  40 mg


Clopidogrel Bisulfate (Plavix -)  75 mg PO DAILY [facility]


   Last Admin: 03/10/19 11:00 Dose:  75 mg


Fentanyl (Sublimaze Injection -)  50 mcg IVPUSH Q5M PRN


   PRN Reason: PAIN-PACU ORDER X 4 DOSES ONLY


   Last Admin: 03/06/19 17:00 Dose:  50 mcg


Piperacillin Sod/Tazobactam (Sod 3.375 gm/ Dextrose)  50 mls @ 100 mls/hr IVPB 

Q8H-IV [facility]I; Protocol


   Last Admin: 03/10/19 10:59 Dose:  100 mls/hr


Lactated Ringer's (Lactated Ringers Solution)  1,000 mls @ 125 mls/hr IV ASDIR 

[facility]


   Last Admin: 03/09/19 23:05 Dose:  125 mls/hr


Levothyroxine Sodium (Synthroid -)  150 mcg PO AM [facility]


   Last Admin: 03/10/19 06:50 Dose:  150 mcg


Metformin HCl (Glucophage Xr -)  750 mg PO TIDAC [facility]


   Last Admin: 03/10/19 11:01 Dose:  750 mg


Metoprolol Tartrate (Lopressor -)  25 mg PO DAILY [facility]


   Last Admin: 03/10/19 11:00 Dose:  25 mg


Ondansetron HCl (Zofran Injection)  4 mg IVPUSH Q6H PRN


   PRN Reason: NAUSEA AND/OR VOMITING


Ondansetron HCl (Zofran Injection)  4 mg IVPUSH Q6H PRN


   PRN Reason: NAUSEA AND/OR VOMITING


Silver Sulfadiazine (Silvadene -)  1 applic TP BID [facility]


   Last Admin: 03/10/19 11:03 Dose:  Not Given











- Objective


Vital Signs: 


 Vital Signs











Temperature  97.7 F   03/09/19 19:52


 


Pulse Rate  71   03/09/19 19:52


 


Respiratory Rate  18   03/09/19 19:52


 


Blood Pressure  181/71 H  03/09/19 19:52


 


O2 Sat by Pulse Oximetry (%)  99   03/06/19 17:55











Constitutional: Yes: No Distress, Calm


Cardiovascular: Yes: Regular Rate and Rhythm


Respiratory: Yes: Regular, CTA Bilaterally


Gastrointestinal: Yes: Normal Bowel Sounds, Soft


Musculoskeletal: Yes: WNL


Extremities: Yes: Other


Wound/Incision: Yes: Dressing Dry and Intact


Neurological: Yes: Alert, Oriented


Psychiatric: Yes: Alert, Oriented


Labs: 


 CBC, BMP





 03/09/19 14:45 





 03/09/19 14:45 











Assessment/Plan








 Problem List 





- Problems


(1) HTN (hypertension)


Code(s): I10 - ESSENTIAL (PRIMARY) HYPERTENSION   





(2) HLD (hyperlipidemia)


Code(s): E78.5 - HYPERLIPIDEMIA, UNSPECIFIED   





(3) Diabetes


Code(s): E11.9 - TYPE 2 DIABETES MELLITUS WITHOUT COMPLICATIONS   





(4) Cellulitis, leg


Code(s): L03.119 - CELLULITIS OF UNSPECIFIED PART OF LIMB   





(5) Infected wound


Code(s): T14.8XXA - OTHER INJURY OF UNSPECIFIED BODY REGION, INITIAL ENCOUNTER; 

L08.9 - LOCAL INFECTION OF THE SKIN AND SUBCUTANEOUS TISSUE, UNSP   





plan


continue abx


will decide after talking to plastics


rest as per the team


will switch to oral tomorrow

## 2019-03-11 RX ADMIN — SILVER SULFADIAZINE SCH APPLIC: 10 CREAM TOPICAL at 11:07

## 2019-03-11 RX ADMIN — ATORVASTATIN CALCIUM SCH MG: 40 TABLET, FILM COATED ORAL at 21:55

## 2019-03-11 RX ADMIN — PIPERACILLIN SODIUM,TAZOBACTAM SODIUM SCH MLS/HR: 3; .375 INJECTION, POWDER, FOR SOLUTION INTRAVENOUS at 01:31

## 2019-03-11 RX ADMIN — SODIUM CHLORIDE, POTASSIUM CHLORIDE, SODIUM LACTATE AND CALCIUM CHLORIDE SCH MLS/HR: 600; 310; 30; 20 INJECTION, SOLUTION INTRAVENOUS at 12:17

## 2019-03-11 RX ADMIN — LEVOTHYROXINE SODIUM SCH MCG: 75 TABLET ORAL at 06:20

## 2019-03-11 RX ADMIN — AMOXICILLIN AND CLAVULANATE POTASSIUM SCH TAB: 875; 125 TABLET, FILM COATED ORAL at 17:19

## 2019-03-11 RX ADMIN — METOPROLOL TARTRATE SCH MG: 25 TABLET, FILM COATED ORAL at 11:07

## 2019-03-11 RX ADMIN — CLOPIDOGREL BISULFATE SCH MG: 75 TABLET, FILM COATED ORAL at 11:07

## 2019-03-11 RX ADMIN — SILVER SULFADIAZINE SCH APPLIC: 10 CREAM TOPICAL at 21:56

## 2019-03-11 RX ADMIN — ASPIRIN 81 MG SCH MG: 81 TABLET ORAL at 11:07

## 2019-03-11 NOTE — PN
Progress Note, Physician


History of Present Illness: 





stable


no new issues





- Current Medication List


Current Medications: 


Active Medications





Acetaminophen (Tylenol -)  650 mg PO Q6H PRN


   PRN Reason: FEVER


   Last Admin: 03/06/19 21:32 Dose:  650 mg


Aspirin (Asa -)  81 mg PO DAILY [facility]


   Last Admin: 03/10/19 11:00 Dose:  81 mg


Atorvastatin Calcium (Lipitor -)  40 mg PO HS [facility]


   Last Admin: 03/10/19 22:20 Dose:  40 mg


Clopidogrel Bisulfate (Plavix -)  75 mg PO DAILY [facility]


   Last Admin: 03/10/19 11:00 Dose:  75 mg


Fentanyl (Sublimaze Injection -)  50 mcg IVPUSH Q5M PRN


   PRN Reason: PAIN-PACU ORDER X 4 DOSES ONLY


   Last Admin: 03/06/19 17:00 Dose:  50 mcg


Lactated Ringer's (Lactated Ringers Solution)  1,000 mls @ 125 mls/hr IV ASDIR 

[facility]


   Last Admin: 03/09/19 23:05 Dose:  125 mls/hr


Levothyroxine Sodium (Synthroid -)  150 mcg PO AM [facility]


   Last Admin: 03/11/19 06:20 Dose:  150 mcg


Metformin HCl (Glucophage Xr -)  750 mg PO TIDAC [facility]


   Last Admin: 03/11/19 06:20 Dose:  750 mg


Metoprolol Tartrate (Lopressor -)  25 mg PO DAILY [facility]


   Last Admin: 03/10/19 11:00 Dose:  25 mg


Ondansetron HCl (Zofran Injection)  4 mg IVPUSH Q6H PRN


   PRN Reason: NAUSEA AND/OR VOMITING


Ondansetron HCl (Zofran Injection)  4 mg IVPUSH Q6H PRN


   PRN Reason: NAUSEA AND/OR VOMITING


Silver Sulfadiazine (Silvadene -)  1 applic TP BID [facility]


   Last Admin: 03/10/19 22:20 Dose:  Not Given











- Objective


Vital Signs: 


 Vital Signs











Temperature  98.1 F   03/11/19 06:29


 


Pulse Rate  80   03/11/19 06:29


 


Respiratory Rate  20   03/11/19 06:29


 


Blood Pressure  163/86   03/11/19 06:29


 


O2 Sat by Pulse Oximetry (%)  99   03/06/19 17:55











Constitutional: Yes: No Distress, Calm


Cardiovascular: Yes: Regular Rate and Rhythm


Respiratory: Yes: Regular, CTA Bilaterally


Gastrointestinal: Yes: Normal Bowel Sounds, Soft


Musculoskeletal: Yes: WNL


Extremities: Yes: Other


Wound/Incision: Yes: Dressing Dry and Intact, Other (wound vac in place)


Neurological: Yes: Alert, Oriented


Psychiatric: Yes: Alert, Oriented


Labs: 


 CBC, BMP





 03/09/19 14:45 





 03/09/19 14:45 











Assessment/Plan








 Problem List 





- Problems


(1) HTN (hypertension)


Code(s): I10 - ESSENTIAL (PRIMARY) HYPERTENSION   





(2) HLD (hyperlipidemia)


Code(s): E78.5 - HYPERLIPIDEMIA, UNSPECIFIED   





(3) Diabetes


Code(s): E11.9 - TYPE 2 DIABETES MELLITUS WITHOUT COMPLICATIONS   





(4) Cellulitis, leg


Code(s): L03.119 - CELLULITIS OF UNSPECIFIED PART OF LIMB   





(5) Infected wound


Code(s): T14.8XXA - OTHER INJURY OF UNSPECIFIED BODY REGION, INITIAL ENCOUNTER; 

L08.9 - LOCAL INFECTION OF THE SKIN AND SUBCUTANEOUS TISSUE, UNSP   





plan


will change to oral abx


await for plastics


rest as per the team


wound care

## 2019-03-11 NOTE — PN
Progress Note, Physician





- Current Medication List


Current Medications: 


Active Medications





Acetaminophen (Tylenol -)  650 mg PO Q6H PRN


   PRN Reason: FEVER


   Last Admin: 03/06/19 21:32 Dose:  650 mg


Amoxicillin/Clavulanate Potassium (Augmentin - 875mg Tablet)  1 tab PO BID@0800,

1730 [facility]


   Last Admin: 03/11/19 17:19 Dose:  1 tab


Aspirin (Asa -)  81 mg PO DAILY [facility]


   Last Admin: 03/11/19 11:07 Dose:  81 mg


Atorvastatin Calcium (Lipitor -)  40 mg PO HS [facility]


   Last Admin: 03/10/19 22:20 Dose:  40 mg


Clopidogrel Bisulfate (Plavix -)  75 mg PO DAILY [facility]


   Last Admin: 03/11/19 11:07 Dose:  75 mg


Fentanyl (Sublimaze Injection -)  50 mcg IVPUSH Q5M PRN


   PRN Reason: PAIN-PACU ORDER X 4 DOSES ONLY


   Last Admin: 03/06/19 17:00 Dose:  50 mcg


Lactated Ringer's (Lactated Ringers Solution)  1,000 mls @ 125 mls/hr IV ASDIR 

[facility]


   Last Admin: 03/11/19 12:17 Dose:  125 mls/hr


Levothyroxine Sodium (Synthroid -)  150 mcg PO AM [facility]


   Last Admin: 03/11/19 06:20 Dose:  150 mcg


Metformin HCl (Glucophage Xr -)  750 mg PO TIDAC [facility]


   Last Admin: 03/11/19 17:19 Dose:  750 mg


Metoprolol Tartrate (Lopressor -)  25 mg PO DAILY [facility]


   Last Admin: 03/11/19 11:07 Dose:  25 mg


Ondansetron HCl (Zofran Injection)  4 mg IVPUSH Q6H PRN


   PRN Reason: NAUSEA AND/OR VOMITING


Ondansetron HCl (Zofran Injection)  4 mg IVPUSH Q6H PRN


   PRN Reason: NAUSEA AND/OR VOMITING


Silver Sulfadiazine (Silvadene -)  1 applic TP BID [facility]


   Last Admin: 03/11/19 11:07 Dose:  1 applic











- Objective


Vital Signs: 


 Vital Signs











Temperature  98.3 F   03/11/19 16:40


 


Pulse Rate  72   03/11/19 16:40


 


Respiratory Rate  20   03/11/19 16:40


 


Blood Pressure  123/63   03/11/19 16:40


 


O2 Sat by Pulse Oximetry (%)  99   03/06/19 17:55











Constitutional: Yes: No Distress


HENT: Yes: Atraumatic


Neck: Yes: Supple


Cardiovascular: Yes: Regular Rate and Rhythm


Respiratory: Yes: CTA Bilaterally


Gastrointestinal: Yes: Normal Bowel Sounds


Extremities: Yes: Other (llex in dressing)


Neurological: Yes: Alert, Oriented


Labs: 


 CBC, BMP





 03/09/19 14:45 





 03/09/19 14:45 











Problem List





- Problems


(1) HTN (hypertension)


Assessment/Plan: 


on meds


monitor


Code(s): I10 - ESSENTIAL (PRIMARY) HYPERTENSION   





(2) HLD (hyperlipidemia)


Assessment/Plan: 


on meds


Code(s): E78.5 - HYPERLIPIDEMIA, UNSPECIFIED   





(3) Diabetes


Assessment/Plan: 


on po meds


monitor bgms


Code(s): E11.9 - TYPE 2 DIABETES MELLITUS WITHOUT COMPLICATIONS   





(4) Cellulitis, leg


Assessment/Plan: 


on iv abx


graft done today


Code(s): L03.119 - CELLULITIS OF UNSPECIFIED PART OF LIMB   





(5) Infected wound


Assessment/Plan: 


iv abx


s/ surgery


Code(s): T14.8XXA - OTHER INJURY OF UNSPECIFIED BODY REGION, INITIAL ENCOUNTER; 

L08.9 - LOCAL INFECTION OF THE SKIN AND SUBCUTANEOUS TISSUE, UNSP

## 2019-03-12 VITALS — DIASTOLIC BLOOD PRESSURE: 72 MMHG | HEART RATE: 80 BPM | TEMPERATURE: 98.2 F | SYSTOLIC BLOOD PRESSURE: 133 MMHG

## 2019-03-12 RX ADMIN — CLOPIDOGREL BISULFATE SCH MG: 75 TABLET, FILM COATED ORAL at 10:34

## 2019-03-12 RX ADMIN — SILVER SULFADIAZINE SCH: 10 CREAM TOPICAL at 10:48

## 2019-03-12 RX ADMIN — LEVOTHYROXINE SODIUM SCH MCG: 75 TABLET ORAL at 06:19

## 2019-03-12 RX ADMIN — AMOXICILLIN AND CLAVULANATE POTASSIUM SCH TAB: 875; 125 TABLET, FILM COATED ORAL at 10:34

## 2019-03-12 RX ADMIN — ASPIRIN 81 MG SCH MG: 81 TABLET ORAL at 10:34

## 2019-03-12 RX ADMIN — METOPROLOL TARTRATE SCH MG: 25 TABLET, FILM COATED ORAL at 10:34

## 2019-03-12 NOTE — PN
Progress Note (short form)





- Note


Progress Note: 





FU for Skin Grafts Left lower leg


Dressing changed Left lower leg yesterday : Graft Pink well adherent , no 

discharge, no erythema, no odor


Donor dressing changed today..Xerform left adherent 





Patient can be discharged today to home, he has been ambulating well, Yvette Boot 

applied yestaerday for the graft compression and prevention of swelling





Recommend Home Care





1. Evaluate Left leg circulation, if foot swelling leg to be elevated





2. Stool for left leg elevation when sitting 





3. No showers till next week wound clinic visit





4. Donor area, leave xeroform adherent, may change Secondary dressing with dry 

4x4, apply tape only on the edges, leave most area exposed to dry





FU in wound clinic on Wednesday in wound clinic , Make Apt


,

## 2019-03-12 NOTE — PN
Progress Note, Physician


History of Present Illness: 





doing well


plastics note noted


wound vac removed





- Current Medication List


Current Medications: 


Active Medications





Acetaminophen (Tylenol -)  650 mg PO Q6H PRN


   PRN Reason: FEVER


   Last Admin: 03/06/19 21:32 Dose:  650 mg


Amoxicillin/Clavulanate Potassium (Augmentin - 875mg Tablet)  1 tab PO BID@0800,

[facility]30 [facility]


   Last Admin: 03/12/19 10:34 Dose:  1 tab


Aspirin (Asa -)  81 mg PO DAILY [facility]


   Last Admin: 03/12/19 10:34 Dose:  81 mg


Atorvastatin Calcium (Lipitor -)  40 mg PO HS [facility]


   Last Admin: 03/11/19 21:55 Dose:  40 mg


Clopidogrel Bisulfate (Plavix -)  75 mg PO DAILY [facility]


   Last Admin: 03/12/19 10:34 Dose:  75 mg


Fentanyl (Sublimaze Injection -)  50 mcg IVPUSH Q5M PRN


   PRN Reason: PAIN-PACU ORDER X 4 DOSES ONLY


   Last Admin: 03/06/19 17:00 Dose:  50 mcg


Lactated Ringer's (Lactated Ringers Solution)  1,000 mls @ 125 mls/hr IV ASDIR 

[facility]


   Last Admin: 03/11/19 12:17 Dose:  125 mls/hr


Levothyroxine Sodium (Synthroid -)  150 mcg PO AM [facility]


   Last Admin: 03/12/19 06:19 Dose:  150 mcg


Metformin HCl (Glucophage Xr -)  750 mg PO TIDAC [facility]


   Last Admin: 03/12/19 11:39 Dose:  750 mg


Metoprolol Tartrate (Lopressor -)  25 mg PO DAILY [facility]


   Last Admin: 03/12/19 10:34 Dose:  25 mg


Ondansetron HCl (Zofran Injection)  4 mg IVPUSH Q6H PRN


   PRN Reason: NAUSEA AND/OR VOMITING


Ondansetron HCl (Zofran Injection)  4 mg IVPUSH Q6H PRN


   PRN Reason: NAUSEA AND/OR VOMITING


Silver Sulfadiazine (Silvadene -)  1 applic TP BID [facility]


   Last Admin: 03/12/19 10:48 Dose:  Not Given











- Objective


Vital Signs: 


 Vital Signs











Temperature  97.8 F   03/12/19 06:15


 


Pulse Rate  73   03/12/19 06:15


 


Respiratory Rate  20   03/12/19 06:15


 


Blood Pressure  130/70   03/12/19 06:15


 


O2 Sat by Pulse Oximetry (%)  94 L  03/11/19 21:00











Constitutional: Yes: No Distress, Calm


Cardiovascular: Yes: Regular Rate and Rhythm


Respiratory: Yes: Regular, CTA Bilaterally


Gastrointestinal: Yes: Normal Bowel Sounds, Soft


Musculoskeletal: Yes: Other


Extremities: Yes: Other


Wound/Incision: Yes: Dressing Dry and Intact, Other (site looks good,leg looks 

good)


Neurological: Yes: Alert, Oriented


Psychiatric: Yes: Alert, Oriented


Labs: 


 CBC, BMP





 03/09/19 14:45 





 03/09/19 14:45 











Assessment/Plan








 Problem List 





- Problems


(1) HTN (hypertension)


Code(s): I10 - ESSENTIAL (PRIMARY) HYPERTENSION   





(2) HLD (hyperlipidemia)


Code(s): E78.5 - HYPERLIPIDEMIA, UNSPECIFIED   





(3) Diabetes


Code(s): E11.9 - TYPE 2 DIABETES MELLITUS WITHOUT COMPLICATIONS   





(4) Cellulitis, leg


Code(s): L03.119 - CELLULITIS OF UNSPECIFIED PART OF LIMB   





(5) Infected wound


Code(s): T14.8XXA - OTHER INJURY OF UNSPECIFIED BODY REGION, INITIAL ENCOUNTER; 

L08.9 - LOCAL INFECTION OF THE SKIN AND SUBCUTANEOUS TISSUE, UNSP   





plan


continue oral abx for another 3 days


then stop it


f/u in wound care


rest as per the team

## 2019-03-12 NOTE — DS
Physical Examination


Vital Signs: 


 Vital Signs











Temperature  97.8 F   03/12/19 06:15


 


Pulse Rate  73   03/12/19 06:15


 


Respiratory Rate  20   03/12/19 06:15


 


Blood Pressure  130/70   03/12/19 06:15


 


O2 Sat by Pulse Oximetry (%)  94 L  03/11/19 21:00











Constitutional: Yes: No Distress


HENT: Yes: Atraumatic


Neck: Yes: Supple


Cardiovascular: Yes: Regular Rate and Rhythm


Respiratory: Yes: CTA Bilaterally


Gastrointestinal: Yes: Normal Bowel Sounds


Extremities: Yes: Other (LLEX IN DRESSING)


Neurological: Yes: Alert, Oriented


Labs: 


 CBC, BMP





 03/09/19 14:45 





 03/09/19 14:45 











Discharge Summary


Reason For Visit: WOUND LEFT LEG


Current Active Problems





Diabetes (Acute)


HLD (hyperlipidemia) (Acute)


HTN (hypertension) (Acute)











- Instructions


Referrals: 


Tiara Holden MD [Staff Physician] - 


Disposition: [facility]/HOME HEALTH CARE





- Home Medications


Comprehensive Discharge Medication List: 


Ambulatory Orders





Levothyroxine [Synthroid -] 150 mcg PO DAILY 11/28/18 


Metformin HCl  mg PO TID 11/28/18 


Metoprolol Tartrate 25 mg PO DAILY 11/28/18 


Ergocalciferol [Vitamin D2] 50,000 unit PO SA 12/07/18 


Clopidogrel Bisulfate [Plavix] 75 mg PO DAILY 01/29/19 


Atorvastatin Ca [Lipitor] 40 mg PO HS  tablet 02/01/19 


Lactobacillus Acidophilus [Bacid -] 1 each PO TID #21 tab 02/01/19 


oxyCODONE HCL [Roxicodone -] 5 mg PO Q6H PRN 7 Days #10 tablet MDD 2 02/01/19 


ASA - 81 mg PO DAILY 02/27/19 


Amox-Tr/K Cl [Augmentin 875-125mg Tablet -] 1 tab PO BID@0800,1730 #6 tablet 03/ 12/19 





[facility]


wound care per plastic surgery

## 2019-10-31 NOTE — PN
Teaching Attending Note


Name of Resident: Christa Desouza





ATTENDING PHYSICIAN STATEMENT





I saw and evaluated the patient.


I reviewed the resident's note and discussed the case with the resident.


I agree with the resident's findings and plan as documented.








SUBJECTIVE:


No fever or chills. reports the ulcer on his L lower leg for few months . 

recently finished a course of Abx and had debridement yesterday. denies fever 

at home. now he denies any SOB , CP , or HA. No pain at surgical site. reports 

intermittent hematuria x 6 months . recently clots . reports being on ASA  





OBJECTIVE:


NAD, MMM. npo facial droop 


CV: RRR.  No MRG 


Lungs: CTAB 


Abd: soft, NT,ND, NL BS 


Ext : L leg with dressing  from mid foot to just below the kne. edema on  toes 

, nl sensation to light touch and can wiggle his toes. DP 2+ .


RLE with no edema , Dp 2+ . no erythema . small 3 mm laceration on anterior 

shin 





ASSESSMENT AND PLAN:








86 y/o gentleman with h/o HTN, HL, CAD, s/p stenting, b/l TKR, R hip 

replacement , Dm , hypothyroidism , and PVD who presented with generalized 

weakness  after L lower leg ulcer debridement. 





1- Infected L calf Ulcer s/p excisional  debridment of tendons and fascia . 

Unfortunately, wound was not examined as the surgical dressing was just placed 

yesterday, but per dr. Santos  description of the wound prior to surgery it 

had a purulent discharge. 


elevated white cound could be reactive after sx or due to infection. 


- Will ask Id to advise about Abx use if indicated 


- team to discuss with vascular 


- no need for IVF 





2- Hematuria: x 6 months . DDx include malignancy ( renal vs bladder ). he 

denies trauma, and he even had it before rios placement yesterday.  has no 

evidence of UTI   on UA. now urine is yellow 


- check renal US


- send urine to cytology 


- might need cystoscopy 


- consult urology 





3- Dm : hold po meds and start SSI 





4- h/o CAD :  will confirm meds and resume 





5- DVT Px: heparin. will stop if macroscopic hemauria develops Abdomen soft, non-tender, no guarding.

## 2021-04-14 ENCOUNTER — HOSPITAL ENCOUNTER (INPATIENT)
Dept: HOSPITAL 74 - JASUSAT | Age: 86
LOS: 2 days | Discharge: HOME | DRG: 714 | End: 2021-04-16
Attending: UROLOGY | Admitting: UROLOGY
Payer: COMMERCIAL

## 2021-04-14 VITALS — BODY MASS INDEX: 30.8 KG/M2

## 2021-04-14 DIAGNOSIS — R31.0: ICD-10-CM

## 2021-04-14 DIAGNOSIS — N40.1: Primary | ICD-10-CM

## 2021-04-14 DIAGNOSIS — R33.8: ICD-10-CM

## 2021-04-14 LAB
ANION GAP SERPL CALC-SCNC: 4 MMOL/L (ref 8–16)
BASOPHILS # BLD: 0.9 % (ref 0–2)
BUN SERPL-MCNC: 21.8 MG/DL (ref 7–18)
CALCIUM SERPL-MCNC: 9 MG/DL (ref 8.5–10.1)
CHLORIDE SERPL-SCNC: 107 MMOL/L (ref 98–107)
CO2 SERPL-SCNC: 30 MMOL/L (ref 21–32)
CREAT SERPL-MCNC: 1.2 MG/DL (ref 0.55–1.3)
DEPRECATED RDW RBC AUTO: 15.5 % (ref 11.9–15.9)
EOSINOPHIL # BLD: 4.7 % (ref 0–4.5)
GLUCOSE SERPL-MCNC: 119 MG/DL (ref 74–106)
HCT VFR BLD CALC: 33.5 % (ref 35.4–49)
HGB BLD-MCNC: 11 GM/DL (ref 11.7–16.9)
LYMPHOCYTES # BLD: 23.2 % (ref 8–40)
MCH RBC QN AUTO: 29.1 PG (ref 25.7–33.7)
MCHC RBC AUTO-ENTMCNC: 32.9 G/DL (ref 32–35.9)
MCV RBC: 88.3 FL (ref 80–96)
MONOCYTES # BLD AUTO: 11.8 % (ref 3.8–10.2)
NEUTROPHILS # BLD: 59.4 % (ref 42.8–82.8)
PLATELET # BLD AUTO: 178 K/MM3 (ref 134–434)
PMV BLD: 8.1 FL (ref 7.5–11.1)
RBC # BLD AUTO: 3.8 M/MM3 (ref 4–5.6)
SODIUM SERPL-SCNC: 140 MMOL/L (ref 136–145)
WBC # BLD AUTO: 6.6 K/MM3 (ref 4–10)

## 2021-04-14 PROCEDURE — 0TJB8ZZ INSPECTION OF BLADDER, VIA NATURAL OR ARTIFICIAL OPENING ENDOSCOPIC: ICD-10-PCS | Performed by: UROLOGY

## 2021-04-14 PROCEDURE — 0VT08ZZ RESECTION OF PROSTATE, VIA NATURAL OR ARTIFICIAL OPENING ENDOSCOPIC: ICD-10-PCS | Performed by: UROLOGY

## 2021-04-14 RX ADMIN — CEFAZOLIN SODIUM SCH MLS: 1 INJECTION, SOLUTION INTRAVENOUS at 18:00

## 2021-04-14 RX ADMIN — CEFAZOLIN SODIUM SCH MLS/HR: 1 INJECTION, SOLUTION INTRAVENOUS at 18:00

## 2021-04-14 RX ADMIN — ACETAMINOPHEN ONE MLS: 10 INJECTION, SOLUTION INTRAVENOUS at 17:30

## 2021-04-14 RX ADMIN — ACETAMINOPHEN ONE MLS: 10 INJECTION, SOLUTION INTRAVENOUS at 14:36

## 2021-04-14 RX ADMIN — DEXTROSE AND SODIUM CHLORIDE SCH MLS/HR: 5; 450 INJECTION, SOLUTION INTRAVENOUS at 20:29

## 2021-04-15 LAB
ANION GAP SERPL CALC-SCNC: 4 MMOL/L (ref 8–16)
BASOPHILS # BLD: 0.6 % (ref 0–2)
BUN SERPL-MCNC: 20.9 MG/DL (ref 7–18)
CALCIUM SERPL-MCNC: 8.5 MG/DL (ref 8.5–10.1)
CHLORIDE SERPL-SCNC: 103 MMOL/L (ref 98–107)
CO2 SERPL-SCNC: 29 MMOL/L (ref 21–32)
CREAT SERPL-MCNC: 1.3 MG/DL (ref 0.55–1.3)
DEPRECATED RDW RBC AUTO: 15.7 % (ref 11.9–15.9)
EOSINOPHIL # BLD: 3.3 % (ref 0–4.5)
GLUCOSE SERPL-MCNC: 217 MG/DL (ref 74–106)
HCT VFR BLD CALC: 30.8 % (ref 35.4–49)
HGB BLD-MCNC: 10.5 GM/DL (ref 11.7–16.9)
LYMPHOCYTES # BLD: 17.6 % (ref 8–40)
MCH RBC QN AUTO: 29.3 PG (ref 25.7–33.7)
MCHC RBC AUTO-ENTMCNC: 33.9 G/DL (ref 32–35.9)
MCV RBC: 86.4 FL (ref 80–96)
MONOCYTES # BLD AUTO: 9.9 % (ref 3.8–10.2)
NEUTROPHILS # BLD: 68.6 % (ref 42.8–82.8)
PLATELET # BLD AUTO: 178 K/MM3 (ref 134–434)
PMV BLD: 8.2 FL (ref 7.5–11.1)
RBC # BLD AUTO: 3.57 M/MM3 (ref 4–5.6)
SODIUM SERPL-SCNC: 137 MMOL/L (ref 136–145)
WBC # BLD AUTO: 6.5 K/MM3 (ref 4–10)

## 2021-04-15 RX ADMIN — CEFAZOLIN SCH MLS/HR: 1 INJECTION, POWDER, FOR SOLUTION INTRAVENOUS at 17:29

## 2021-04-15 RX ADMIN — CEFAZOLIN SCH MLS/HR: 1 INJECTION, POWDER, FOR SOLUTION INTRAVENOUS at 09:59

## 2021-04-15 RX ADMIN — DEXTROSE AND SODIUM CHLORIDE SCH MLS/HR: 5; 450 INJECTION, SOLUTION INTRAVENOUS at 04:27

## 2021-04-15 RX ADMIN — DEXTROSE AND SODIUM CHLORIDE SCH MLS/HR: 5; 450 INJECTION, SOLUTION INTRAVENOUS at 13:03

## 2021-04-16 VITALS — DIASTOLIC BLOOD PRESSURE: 59 MMHG | TEMPERATURE: 98.6 F | HEART RATE: 64 BPM | SYSTOLIC BLOOD PRESSURE: 129 MMHG

## 2021-04-16 RX ADMIN — CEFAZOLIN SCH MLS/HR: 1 INJECTION, POWDER, FOR SOLUTION INTRAVENOUS at 01:48

## 2021-04-16 RX ADMIN — DEXTROSE AND SODIUM CHLORIDE SCH MLS/HR: 5; 450 INJECTION, SOLUTION INTRAVENOUS at 06:16

## 2021-04-16 RX ADMIN — CEFAZOLIN SCH MLS/HR: 1 INJECTION, POWDER, FOR SOLUTION INTRAVENOUS at 10:26
